# Patient Record
Sex: FEMALE | Race: WHITE | Employment: OTHER | URBAN - METROPOLITAN AREA
[De-identification: names, ages, dates, MRNs, and addresses within clinical notes are randomized per-mention and may not be internally consistent; named-entity substitution may affect disease eponyms.]

---

## 2019-11-19 ENCOUNTER — APPOINTMENT (OUTPATIENT)
Dept: CT IMAGING | Age: 84
End: 2019-11-19
Attending: EMERGENCY MEDICINE
Payer: MEDICARE

## 2019-11-19 ENCOUNTER — APPOINTMENT (OUTPATIENT)
Dept: GENERAL RADIOLOGY | Age: 84
End: 2019-11-19
Attending: EMERGENCY MEDICINE
Payer: MEDICARE

## 2019-11-19 ENCOUNTER — HOSPITAL ENCOUNTER (OUTPATIENT)
Age: 84
Setting detail: OBSERVATION
Discharge: NURSING, REHAB OR LONG-TERM CARE | End: 2019-11-22
Attending: EMERGENCY MEDICINE | Admitting: FAMILY MEDICINE
Payer: MEDICARE

## 2019-11-19 DIAGNOSIS — S51.011A ELBOW LACERATION, RIGHT, INITIAL ENCOUNTER: ICD-10-CM

## 2019-11-19 DIAGNOSIS — S01.01XA LACERATION OF SCALP, INITIAL ENCOUNTER: ICD-10-CM

## 2019-11-19 DIAGNOSIS — R09.02 HYPOXIA: ICD-10-CM
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
## 2019-11-19 DIAGNOSIS — N39.0 URINARY TRACT INFECTION WITHOUT HEMATURIA, SITE UNSPECIFIED: ICD-10-CM

## 2019-11-19 LAB
ALBUMIN SERPL-MCNC: 3.3 G/DL (ref 3.2–4.6)
ALBUMIN/GLOB SERPL: 0.9 {RATIO} (ref 1.2–3.5)
ALP SERPL-CCNC: 97 U/L (ref 50–136)
ALT SERPL-CCNC: 17 U/L (ref 12–65)
ANION GAP SERPL CALC-SCNC: 5 MMOL/L (ref 7–16)
APPEARANCE UR: ABNORMAL
ARTERIAL PATENCY WRIST A: YES
AST SERPL-CCNC: 18 U/L (ref 15–37)
BACTERIA URNS QL MICRO: ABNORMAL /HPF
BASE EXCESS BLD CALC-SCNC: 1 MMOL/L
BASOPHILS # BLD: 0 K/UL (ref 0–0.2)
BASOPHILS NFR BLD: 1 % (ref 0–2)
BDY SITE: ABNORMAL
BILIRUB SERPL-MCNC: 0.5 MG/DL (ref 0.2–1.1)
BILIRUB UR QL: NEGATIVE
BNP SERPL-MCNC: 797 PG/ML
BODY TEMPERATURE: 98.6
BUN SERPL-MCNC: 22 MG/DL (ref 8–23)
CALCIUM SERPL-MCNC: 9.4 MG/DL (ref 8.3–10.4)
CHLORIDE SERPL-SCNC: 108 MMOL/L (ref 98–107)
CO2 BLD-SCNC: 25 MMOL/L
CO2 SERPL-SCNC: 28 MMOL/L (ref 21–32)
COLLECT TIME,HTIME: 2151
COLOR UR: YELLOW
CREAT SERPL-MCNC: 0.83 MG/DL (ref 0.6–1)
DIFFERENTIAL METHOD BLD: ABNORMAL
EOSINOPHIL # BLD: 0.3 K/UL (ref 0–0.8)
EOSINOPHIL NFR BLD: 5 % (ref 0.5–7.8)
EPI CELLS #/AREA URNS HPF: ABNORMAL /HPF
ERYTHROCYTE [DISTWIDTH] IN BLOOD BY AUTOMATED COUNT: 13.2 % (ref 11.9–14.6)
GAS FLOW.O2 O2 DELIVERY SYS: ABNORMAL L/MIN
GLOBULIN SER CALC-MCNC: 3.8 G/DL (ref 2.3–3.5)
GLUCOSE SERPL-MCNC: 101 MG/DL (ref 65–100)
GLUCOSE UR STRIP.AUTO-MCNC: NEGATIVE MG/DL
HCO3 BLD-SCNC: 24 MMOL/L (ref 22–26)
HCT VFR BLD AUTO: 40.7 % (ref 35.8–46.3)
HGB BLD-MCNC: 14 G/DL (ref 11.7–15.4)
HGB UR QL STRIP: NEGATIVE
IMM GRANULOCYTES # BLD AUTO: 0 K/UL (ref 0–0.5)
IMM GRANULOCYTES NFR BLD AUTO: 1 % (ref 0–5)
KETONES UR QL STRIP.AUTO: NEGATIVE MG/DL
LEUKOCYTE ESTERASE UR QL STRIP.AUTO: ABNORMAL
LYMPHOCYTES # BLD: 1.2 K/UL (ref 0.5–4.6)
LYMPHOCYTES NFR BLD: 19 % (ref 13–44)
MCH RBC QN AUTO: 34.7 PG (ref 26.1–32.9)
MCHC RBC AUTO-ENTMCNC: 34.4 G/DL (ref 31.4–35)
MCV RBC AUTO: 100.7 FL (ref 79.6–97.8)
MONOCYTES # BLD: 0.6 K/UL (ref 0.1–1.3)
MONOCYTES NFR BLD: 9 % (ref 4–12)
NEUTS SEG # BLD: 4.1 K/UL (ref 1.7–8.2)
NEUTS SEG NFR BLD: 66 % (ref 43–78)
NITRITE UR QL STRIP.AUTO: NEGATIVE
NRBC # BLD: 0 K/UL (ref 0–0.2)
O2/TOTAL GAS SETTING VFR VENT: 21 %
OTHER OBSERVATIONS,UCOM: ABNORMAL
PCO2 BLD: 33.3 MMHG (ref 35–45)
PH BLD: 7.46 [PH] (ref 7.35–7.45)
PH UR STRIP: 6.5 [PH] (ref 5–9)
PLATELET # BLD AUTO: 273 K/UL (ref 150–450)
PMV BLD AUTO: 8.9 FL (ref 9.4–12.3)
PO2 BLD: 36 MMHG (ref 75–100)
POTASSIUM SERPL-SCNC: 4.2 MMOL/L (ref 3.5–5.1)
PROT SERPL-MCNC: 7.1 G/DL (ref 6.3–8.2)
PROT UR STRIP-MCNC: NEGATIVE MG/DL
RBC # BLD AUTO: 4.04 M/UL (ref 4.05–5.2)
SAO2 % BLD: 74 % (ref 95–98)
SERVICE CMNT-IMP: ABNORMAL
SERVICE CMNT-IMP: ABNORMAL
SODIUM SERPL-SCNC: 141 MMOL/L (ref 136–145)
SP GR UR REFRACTOMETRY: 1.01 (ref 1–1.02)
SPECIMEN TYPE: ABNORMAL
UROBILINOGEN UR QL STRIP.AUTO: 1 EU/DL (ref 0.2–1)
WBC # BLD AUTO: 6.3 K/UL (ref 4.3–11.1)
WBC URNS QL MICRO: ABNORMAL /HPF

## 2019-11-19 PROCEDURE — 99285 EMERGENCY DEPT VISIT HI MDM: CPT | Performed by: EMERGENCY MEDICINE

## 2019-11-19 PROCEDURE — 87088 URINE BACTERIA CULTURE: CPT

## 2019-11-19 PROCEDURE — 51701 INSERT BLADDER CATHETER: CPT | Performed by: EMERGENCY MEDICINE

## 2019-11-19 PROCEDURE — 70450 CT HEAD/BRAIN W/O DYE: CPT

## 2019-11-19 PROCEDURE — 73552 X-RAY EXAM OF FEMUR 2/>: CPT

## 2019-11-19 PROCEDURE — 81001 URINALYSIS AUTO W/SCOPE: CPT

## 2019-11-19 PROCEDURE — 80053 COMPREHEN METABOLIC PANEL: CPT

## 2019-11-19 PROCEDURE — 85025 COMPLETE CBC W/AUTO DIFF WBC: CPT

## 2019-11-19 PROCEDURE — 77030031139 HC SUT VCRL2 J&J -A: Performed by: EMERGENCY MEDICINE

## 2019-11-19 PROCEDURE — 87086 URINE CULTURE/COLONY COUNT: CPT

## 2019-11-19 PROCEDURE — 77030002916 HC SUT ETHLN J&J -A: Performed by: EMERGENCY MEDICINE

## 2019-11-19 PROCEDURE — 87186 SC STD MICRODIL/AGAR DIL: CPT

## 2019-11-19 PROCEDURE — 71260 CT THORAX DX C+: CPT

## 2019-11-19 PROCEDURE — 71046 X-RAY EXAM CHEST 2 VIEWS: CPT

## 2019-11-19 PROCEDURE — 83880 ASSAY OF NATRIURETIC PEPTIDE: CPT

## 2019-11-19 PROCEDURE — 75810000293 HC SIMP/SUPERF WND  RPR: Performed by: EMERGENCY MEDICINE

## 2019-11-19 PROCEDURE — 74011250637 HC RX REV CODE- 250/637: Performed by: EMERGENCY MEDICINE

## 2019-11-19 PROCEDURE — 36600 WITHDRAWAL OF ARTERIAL BLOOD: CPT

## 2019-11-19 PROCEDURE — 74011636320 HC RX REV CODE- 636/320: Performed by: EMERGENCY MEDICINE

## 2019-11-19 PROCEDURE — 77030039266 HC ADH SKN EXOFIN S2SG -A: Performed by: EMERGENCY MEDICINE

## 2019-11-19 PROCEDURE — 82803 BLOOD GASES ANY COMBINATION: CPT

## 2019-11-19 RX ORDER — NITROFURANTOIN 25; 75 MG/1; MG/1
100 CAPSULE ORAL 2 TIMES DAILY
Qty: 6 CAP | Refills: 0 | Status: ON HOLD | OUTPATIENT
Start: 2019-11-19 | End: 2019-11-20

## 2019-11-19 RX ORDER — NITROFURANTOIN MACROCRYSTALS 50 MG/1
50 CAPSULE ORAL
Status: COMPLETED | OUTPATIENT
Start: 2019-11-19 | End: 2019-11-19

## 2019-11-19 RX ADMIN — Medication 10 ML: at 23:00

## 2019-11-19 RX ADMIN — NITROFURANTOIN MACROCRYSTALS 50 MG: 50 CAPSULE ORAL at 20:18

## 2019-11-19 RX ADMIN — IOPAMIDOL 80 ML: 755 INJECTION, SOLUTION INTRAVENOUS at 23:00

## 2019-11-19 NOTE — ED TRIAGE NOTES
EMS reports patient fell while trying to get her telephone. Has a bandaged laceration to her head and bandaged laceration to her right forearm. EMS reports patient is at her normal mentation. EMS reports facility states patient has been seeing dogs and cats.

## 2019-11-19 NOTE — ED PROVIDER NOTES
Provider in triage  80year old female presents to the ER via EMS from her living facility. She is unable to provide any info. EMS reported that patient has been confused and hallucinating per facility. Then got up without assistance and fell. Laceration to forehead, abrasion to the right elbow. CT head and basic medical work up ordered. Awaiting room to complete eval.    Sandra Murray MD      The history is provided by the patient and the EMS personnel. The history is limited by the condition of the patient. Fall   The accident occurred 3 to 5 hours ago. The fall occurred while standing. She fell from a height of ground level. She landed on carpet. The volume of blood lost was minimal. The point of impact was the head and right elbow. The patient is experiencing no pain. She was not ambulatory at the scene. There was no entrapment after the fall. There was no drug use involved in the accident. There was no alcohol use involved in the accident. Associated symptoms include laceration. Pertinent negatives include no visual change, no fever, no numbness, no abdominal pain, no bowel incontinence, no vomiting, no headaches, no loss of consciousness and no tingling. The risk factors include dementia and being elderly. The symptoms are aggravated by pressure on injury.         Past Medical History:   Diagnosis Date    Breast cancer ([facility] [address]     Colitis, acute 5/22/2013    Encephalopathy acute 5/22/2013    Hyperlipidemia     Hypertension     Hypothyroidism 12/23/2010    Hypothyroidism 12/23/2010    Unspecified constipation 5/18/2013       Past Surgical History:   Procedure Laterality Date    BREAST SURGERY PROCEDURE UNLISTED      mastectomy 1995 to L    HX APPENDECTOMY      HX HYSTERECTOMY      HX ORTHOPAEDIC      left hip    HX OTHER SURGICAL      hernia         Family History:   Problem Relation Age of Onset    Heart Disease Mother     Cancer Father        Social History     Socioeconomic History  Marital status:      Spouse name: Not on file    Number of children: Not on file    Years of education: Not on file    Highest education level: Not on file   Occupational History    Not on file   Social Needs    Financial resource strain: Not on file    Food insecurity:     Worry: Not on file     Inability: Not on file    Transportation needs:     Medical: Not on file     Non-medical: Not on file   Tobacco Use    Smoking status: Never Smoker    Smokeless tobacco: Never Used   Substance and Sexual Activity    Alcohol use: No    Drug use: No    Sexual activity: Not on file   Lifestyle    Physical activity:     Days per week: Not on file     Minutes per session: Not on file    Stress: Not on file   Relationships    Social connections:     Talks on phone: Not on file     Gets together: Not on file     Attends Confucianism service: Not on file     Active member of club or organization: Not on file     Attends meetings of clubs or organizations: Not on file     Relationship status: Not on file    Intimate partner violence:     Fear of current or ex partner: Not on file     Emotionally abused: Not on file     Physically abused: Not on file     Forced sexual activity: Not on file   Other Topics Concern    Not on file   Social History Narrative    Not on file         ALLERGIES: Codeine; Penicillin g; and Adhesive tape    Review of Systems   Unable to perform ROS: Dementia   Constitutional: Negative for fever. Gastrointestinal: Negative for abdominal pain, bowel incontinence and vomiting. Neurological: Negative for tingling, loss of consciousness, numbness and headaches. Psychiatric/Behavioral: Positive for hallucinations. Vitals:    11/19/19 1521   BP: 147/54   Pulse: 74   Resp: 16   Temp: 98.4 °F (36.9 °C)   SpO2: 92%   Weight: 43.1 kg (95 lb)   Height: 5' 2\" (1.575 m)            Physical Exam   Constitutional: She appears well-developed and well-nourished. No distress.    HENT:   Head: Normocephalic. Head is with contusion and with laceration. Head is without raccoon's eyes and without Rojas's sign. Right Ear: External ear normal. No hemotympanum. Left Ear: External ear normal. No hemotympanum. Mouth/Throat: Oropharynx is clear and moist.   Eyes: Pupils are equal, round, and reactive to light. Conjunctivae and EOM are normal.   Neck: Normal range of motion. Neck supple. Cardiovascular: Normal rate, regular rhythm, normal heart sounds and intact distal pulses. Pulmonary/Chest: Effort normal and breath sounds normal.   Abdominal: Soft. Bowel sounds are normal. There is no tenderness. Musculoskeletal: Normal range of motion. She exhibits no edema. Right forearm: She exhibits laceration. She exhibits no deformity. Arms:  Neurological: She is alert. She has normal strength. She is disoriented. No cranial nerve deficit or sensory deficit. Skin: Skin is warm and dry. Capillary refill takes less than 2 seconds. Laceration noted. Psychiatric: She has a normal mood and affect. Her speech is normal. Cognition and memory are impaired. Nursing note and vitals reviewed.        MDM  Number of Diagnoses or Management Options  Elbow laceration, right, initial encounter: new and does not require workup  Fall, initial encounter: new and requires workup  Laceration of scalp, initial encounter: new and requires workup  Urinary tract infection without hematuria, site unspecified: new and requires workup     Amount and/or Complexity of Data Reviewed  Clinical lab tests: ordered and reviewed  Tests in the radiology section of CPT®: ordered and reviewed  Obtain history from someone other than the patient: yes  Review and summarize past medical records: yes (Patient was noted to have a right greater trochanteric hip fracture in July of this year at [address])    Risk of Complications, Morbidity, and/or Mortality  Presenting problems: high  Diagnostic procedures: moderate  Management options: moderate    Patient Progress  Patient progress: stable         Wound Repair  Date/Time: 11/19/2019 6:00 PM  Performed by: attendingPreparation: skin prepped with Betadine  Pre-procedure re-eval: Immediately prior to the procedure, the patient was reevaluated and found suitable for the planned procedure and any planned medications. Location details: scalp and right elbow  Wound length:2.6 - 7.5 cm    Anesthesia:  Local Anesthetic: lidocaine 1% without epinephrine  Anesthetic total: 1 mL  Foreign bodies: no foreign bodies  Irrigation solution: saline  Irrigation method: syringe  Debridement: none  Skin closure: 5-0 nylon and glue  Number of sutures: 2  Technique: simple  Approximation: close  Patient tolerance: Patient tolerated the procedure well with no immediate complications  My total time at bedside, performing this procedure was 1-15 minutes. Comments: 1 cm laceration of the right elbow was cleaned, prepped with Betadine, and subsequently closed with Dermabond. The scalp wound was closed with two 4-0 Vicryl sutures        Results Reviewed:      Recent Results (from the past 24 hour(s))   CBC WITH AUTOMATED DIFF    Collection Time: 11/19/19  3:32 PM   Result Value Ref Range    WBC 6.3 4.3 - 11.1 K/uL    RBC 4.04 (L) 4.05 - 5.2 M/uL    HGB 14.0 11.7 - 15.4 g/dL    HCT 40.7 35.8 - 46.3 %    .7 (H) 79.6 - 97.8 FL    MCH 34.7 (H) 26.1 - 32.9 PG    MCHC 34.4 31.4 - 35.0 g/dL    RDW 13.2 11.9 - 14.6 %    PLATELET 389 168 - 034 K/uL    MPV 8.9 (L) 9.4 - 12.3 FL    ABSOLUTE NRBC 0.00 0.0 - 0.2 K/uL    DF AUTOMATED      NEUTROPHILS 66 43 - 78 %    LYMPHOCYTES 19 13 - 44 %    MONOCYTES 9 4.0 - 12.0 %    EOSINOPHILS 5 0.5 - 7.8 %    BASOPHILS 1 0.0 - 2.0 %    IMMATURE GRANULOCYTES 1 0.0 - 5.0 %    ABS. NEUTROPHILS 4.1 1.7 - 8.2 K/UL    ABS. LYMPHOCYTES 1.2 0.5 - 4.6 K/UL    ABS. MONOCYTES 0.6 0.1 - 1.3 K/UL    ABS. EOSINOPHILS 0.3 0.0 - 0.8 K/UL    ABS. BASOPHILS 0.0 0.0 - 0.2 K/UL    ABS. IMM.  GRANS. 0.0 0.0 - 0.5 K/UL   METABOLIC PANEL, COMPREHENSIVE    Collection Time: 11/19/19  3:32 PM   Result Value Ref Range    Sodium 141 136 - 145 mmol/L    Potassium 4.2 3.5 - 5.1 mmol/L    Chloride 108 (H) 98 - 107 mmol/L    CO2 28 21 - 32 mmol/L    Anion gap 5 (L) 7 - 16 mmol/L    Glucose 101 (H) 65 - 100 mg/dL    BUN 22 8 - 23 MG/DL    Creatinine 0.83 0.6 - 1.0 MG/DL    GFR est AA >60 >60 ml/min/1.73m2    GFR est non-AA >60 >60 ml/min/1.73m2    Calcium 9.4 8.3 - 10.4 MG/DL    Bilirubin, total 0.5 0.2 - 1.1 MG/DL    ALT (SGPT) 17 12 - 65 U/L    AST (SGOT) 18 15 - 37 U/L    Alk. phosphatase 97 50 - 136 U/L    Protein, total 7.1 6.3 - 8.2 g/dL    Albumin 3.3 3.2 - 4.6 g/dL    Globulin 3.8 (H) 2.3 - 3.5 g/dL    A-G Ratio 0.9 (L) 1.2 - 3.5     URINALYSIS W/ RFLX MICROSCOPIC    Collection Time: 11/19/19  5:07 PM   Result Value Ref Range    Color YELLOW      Appearance CLOUDY      Specific gravity 1.012 1.001 - 1.023      pH (UA) 6.5 5.0 - 9.0      Protein NEGATIVE  NEG mg/dL    Glucose NEGATIVE  mg/dL    Ketone NEGATIVE  NEG mg/dL    Bilirubin NEGATIVE  NEG      Blood NEGATIVE  NEG      Urobilinogen 1.0 0.2 - 1.0 EU/dL    Nitrites NEGATIVE  NEG      Leukocyte Esterase TRACE (A) NEG      WBC 0-3 0 /hpf    Epithelial cells 0-3 0 /hpf    Bacteria 4+ (H) 0 /hpf    Other observations RESULTS VERIFIED MANUALLY       XR FEMUR RT 2 VS   Final Result   IMPRESSION: Age-indeterminate avulsion fracture of the greater trochanter. Diffuse osteopenia. Would consider further evaluation with right hip MRI to   better evaluate for occult fracture. XR CHEST PA LAT   Final Result   IMPRESSION: Negative for acute abnormality. CT HEAD WO CONT   Final Result   IMPRESSION:  Negative for acute intracranial abnormality. Chronic changes. I discussed the results of all labs, procedures, radiographs, and treatments with the patient and available family. Treatment plan is agreed upon and the patient is ready for discharge. All voiced understanding of the discharge plan and medication instructions or changes as appropriate. Questions about treatment in the ED were answered. All were encouraged to return should symptoms worsen or new problems develop. Dictated using voice recognition software.  Proofread, but unrecognized errors may exist.

## 2019-11-20 ENCOUNTER — APPOINTMENT (OUTPATIENT)
Dept: MRI IMAGING | Age: 84
End: 2019-11-20
Attending: INTERNAL MEDICINE
Payer: MEDICARE

## 2019-11-20 PROBLEM — N39.0 UTI (URINARY TRACT INFECTION): Status: ACTIVE | Noted: 2019-11-20

## 2019-11-20 PROBLEM — R93.89 ABNORMAL X-RAY: Status: ACTIVE | Noted: 2019-11-20

## 2019-11-20 PROBLEM — R09.02 HYPOXIA: Status: ACTIVE | Noted: 2019-11-20

## 2019-11-20 PROBLEM — G93.41 ACUTE METABOLIC ENCEPHALOPATHY: Status: ACTIVE | Noted: 2019-11-20

## 2019-11-20 LAB
ANION GAP SERPL CALC-SCNC: 7 MMOL/L (ref 7–16)
BASOPHILS # BLD: 0 K/UL (ref 0–0.2)
BASOPHILS NFR BLD: 1 % (ref 0–2)
BUN SERPL-MCNC: 17 MG/DL (ref 8–23)
CALCIUM SERPL-MCNC: 9.2 MG/DL (ref 8.3–10.4)
CHLORIDE SERPL-SCNC: 107 MMOL/L (ref 98–107)
CO2 SERPL-SCNC: 28 MMOL/L (ref 21–32)
CREAT SERPL-MCNC: 0.67 MG/DL (ref 0.6–1)
DIFFERENTIAL METHOD BLD: ABNORMAL
EOSINOPHIL # BLD: 0.6 K/UL (ref 0–0.8)
EOSINOPHIL NFR BLD: 7 % (ref 0.5–7.8)
ERYTHROCYTE [DISTWIDTH] IN BLOOD BY AUTOMATED COUNT: 13.2 % (ref 11.9–14.6)
GLUCOSE SERPL-MCNC: 93 MG/DL (ref 65–100)
HCT VFR BLD AUTO: 38.6 % (ref 35.8–46.3)
HGB BLD-MCNC: 13 G/DL (ref 11.7–15.4)
IMM GRANULOCYTES # BLD AUTO: 0.1 K/UL (ref 0–0.5)
IMM GRANULOCYTES NFR BLD AUTO: 1 % (ref 0–5)
LYMPHOCYTES # BLD: 1.1 K/UL (ref 0.5–4.6)
LYMPHOCYTES NFR BLD: 13 % (ref 13–44)
MCH RBC QN AUTO: 34.3 PG (ref 26.1–32.9)
MCHC RBC AUTO-ENTMCNC: 33.7 G/DL (ref 31.4–35)
MCV RBC AUTO: 101.8 FL (ref 79.6–97.8)
MONOCYTES # BLD: 0.6 K/UL (ref 0.1–1.3)
MONOCYTES NFR BLD: 7 % (ref 4–12)
NEUTS SEG # BLD: 6.1 K/UL (ref 1.7–8.2)
NEUTS SEG NFR BLD: 72 % (ref 43–78)
NRBC # BLD: 0 K/UL (ref 0–0.2)
PLATELET # BLD AUTO: 245 K/UL (ref 150–450)
PMV BLD AUTO: 9.2 FL (ref 9.4–12.3)
POTASSIUM SERPL-SCNC: 3.7 MMOL/L (ref 3.5–5.1)
RBC # BLD AUTO: 3.79 M/UL (ref 4.05–5.2)
SODIUM SERPL-SCNC: 142 MMOL/L (ref 136–145)
WBC # BLD AUTO: 8.5 K/UL (ref 4.3–11.1)

## 2019-11-20 PROCEDURE — 36415 COLL VENOUS BLD VENIPUNCTURE: CPT

## 2019-11-20 PROCEDURE — 80048 BASIC METABOLIC PNL TOTAL CA: CPT

## 2019-11-20 PROCEDURE — C8929 TTE W OR WO FOL WCON,DOPPLER: HCPCS

## 2019-11-20 PROCEDURE — 85025 COMPLETE CBC W/AUTO DIFF WBC: CPT

## 2019-11-20 PROCEDURE — 96372 THER/PROPH/DIAG INJ SC/IM: CPT

## 2019-11-20 PROCEDURE — 77030020256 HC SOL INJ NACL 0.9%  500ML

## 2019-11-20 PROCEDURE — 74011000302 HC RX REV CODE- 302: Performed by: INTERNAL MEDICINE

## 2019-11-20 PROCEDURE — 74011250636 HC RX REV CODE- 250/636: Performed by: FAMILY MEDICINE

## 2019-11-20 PROCEDURE — 74011250637 HC RX REV CODE- 250/637: Performed by: INTERNAL MEDICINE

## 2019-11-20 PROCEDURE — 86580 TB INTRADERMAL TEST: CPT | Performed by: INTERNAL MEDICINE

## 2019-11-20 PROCEDURE — 99218 HC RM OBSERVATION: CPT

## 2019-11-20 PROCEDURE — 74011250636 HC RX REV CODE- 250/636: Performed by: INTERNAL MEDICINE

## 2019-11-20 PROCEDURE — 96365 THER/PROPH/DIAG IV INF INIT: CPT

## 2019-11-20 PROCEDURE — C1713 ANCHOR/SCREW BN/BN,TIS/BN: HCPCS

## 2019-11-20 PROCEDURE — 74011250637 HC RX REV CODE- 250/637: Performed by: FAMILY MEDICINE

## 2019-11-20 PROCEDURE — 77030019605

## 2019-11-20 RX ORDER — ONDANSETRON 2 MG/ML
4 INJECTION INTRAMUSCULAR; INTRAVENOUS
Status: DISCONTINUED | OUTPATIENT
Start: 2019-11-20 | End: 2019-11-22 | Stop reason: HOSPADM

## 2019-11-20 RX ORDER — DIPHENHYDRAMINE HCL 25 MG
25 CAPSULE ORAL
Status: DISCONTINUED | OUTPATIENT
Start: 2019-11-20 | End: 2019-11-22 | Stop reason: HOSPADM

## 2019-11-20 RX ORDER — ACETAMINOPHEN 500 MG
1000 TABLET ORAL
COMMUNITY
End: 2019-11-22

## 2019-11-20 RX ORDER — ATORVASTATIN CALCIUM 10 MG/1
10 TABLET, FILM COATED ORAL
Status: DISCONTINUED | OUTPATIENT
Start: 2019-11-20 | End: 2019-11-22 | Stop reason: HOSPADM

## 2019-11-20 RX ORDER — METOPROLOL SUCCINATE 50 MG/1
50 TABLET, EXTENDED RELEASE ORAL DAILY
Status: DISCONTINUED | OUTPATIENT
Start: 2019-11-20 | End: 2019-11-22 | Stop reason: HOSPADM

## 2019-11-20 RX ORDER — FAMOTIDINE 20 MG/1
20 TABLET, FILM COATED ORAL DAILY
Status: DISCONTINUED | OUTPATIENT
Start: 2019-11-20 | End: 2019-11-22 | Stop reason: HOSPADM

## 2019-11-20 RX ORDER — PANTOPRAZOLE SODIUM 40 MG/1
40 TABLET, DELAYED RELEASE ORAL
Status: DISCONTINUED | OUTPATIENT
Start: 2019-11-20 | End: 2019-11-22 | Stop reason: HOSPADM

## 2019-11-20 RX ORDER — LANOLIN ALCOHOL/MO/W.PET/CERES
3 CREAM (GRAM) TOPICAL
COMMUNITY

## 2019-11-20 RX ORDER — ASPIRIN 81 MG/1
81 TABLET ORAL DAILY
Status: DISCONTINUED | OUTPATIENT
Start: 2019-11-20 | End: 2019-11-22 | Stop reason: HOSPADM

## 2019-11-20 RX ORDER — SODIUM CHLORIDE 0.9 % (FLUSH) 0.9 %
10 SYRINGE (ML) INJECTION
Status: COMPLETED | OUTPATIENT
Start: 2019-11-20 | End: 2019-11-19

## 2019-11-20 RX ORDER — CHOLECALCIFEROL (VITAMIN D3) 125 MCG
2000 CAPSULE ORAL DAILY
COMMUNITY

## 2019-11-20 RX ORDER — LEVOTHYROXINE SODIUM 25 UG/1
25 TABLET ORAL
COMMUNITY

## 2019-11-20 RX ORDER — FUROSEMIDE 20 MG/1
20 TABLET ORAL DAILY
Status: DISCONTINUED | OUTPATIENT
Start: 2019-11-20 | End: 2019-11-22 | Stop reason: HOSPADM

## 2019-11-20 RX ORDER — SODIUM CHLORIDE 0.9 % (FLUSH) 0.9 %
5-40 SYRINGE (ML) INJECTION AS NEEDED
Status: DISCONTINUED | OUTPATIENT
Start: 2019-11-20 | End: 2019-11-22 | Stop reason: HOSPADM

## 2019-11-20 RX ORDER — SODIUM CHLORIDE 0.9 % (FLUSH) 0.9 %
5-40 SYRINGE (ML) INJECTION EVERY 8 HOURS
Status: DISCONTINUED | OUTPATIENT
Start: 2019-11-20 | End: 2019-11-22 | Stop reason: HOSPADM

## 2019-11-20 RX ORDER — MIRTAZAPINE 15 MG/1
15 TABLET, FILM COATED ORAL
COMMUNITY

## 2019-11-20 RX ORDER — BISACODYL 5 MG
5 TABLET, DELAYED RELEASE (ENTERIC COATED) ORAL DAILY PRN
Status: DISCONTINUED | OUTPATIENT
Start: 2019-11-20 | End: 2019-11-22 | Stop reason: HOSPADM

## 2019-11-20 RX ORDER — POTASSIUM CHLORIDE 750 MG/1
10 TABLET, EXTENDED RELEASE ORAL DAILY
Status: DISCONTINUED | OUTPATIENT
Start: 2019-11-20 | End: 2019-11-22 | Stop reason: HOSPADM

## 2019-11-20 RX ORDER — MELOXICAM 7.5 MG/1
7.5 TABLET ORAL
COMMUNITY
End: 2019-11-22

## 2019-11-20 RX ORDER — HEPARIN SODIUM 5000 [USP'U]/ML
5000 INJECTION, SOLUTION INTRAVENOUS; SUBCUTANEOUS EVERY 8 HOURS
Status: DISCONTINUED | OUTPATIENT
Start: 2019-11-20 | End: 2019-11-22 | Stop reason: HOSPADM

## 2019-11-20 RX ORDER — ALBUTEROL SULFATE 0.83 MG/ML
2.5 SOLUTION RESPIRATORY (INHALATION)
Status: DISCONTINUED | OUTPATIENT
Start: 2019-11-20 | End: 2019-11-22 | Stop reason: HOSPADM

## 2019-11-20 RX ORDER — AMLODIPINE BESYLATE 5 MG/1
2.5 TABLET ORAL DAILY
Status: DISCONTINUED | OUTPATIENT
Start: 2019-11-20 | End: 2019-11-22 | Stop reason: HOSPADM

## 2019-11-20 RX ORDER — UREA 10 %
100 LOTION (ML) TOPICAL DAILY
COMMUNITY

## 2019-11-20 RX ORDER — LOSARTAN POTASSIUM 50 MG/1
100 TABLET ORAL DAILY
Status: DISCONTINUED | OUTPATIENT
Start: 2019-11-20 | End: 2019-11-22 | Stop reason: HOSPADM

## 2019-11-20 RX ORDER — NALOXONE HYDROCHLORIDE 0.4 MG/ML
0.4 INJECTION, SOLUTION INTRAMUSCULAR; INTRAVENOUS; SUBCUTANEOUS AS NEEDED
Status: DISCONTINUED | OUTPATIENT
Start: 2019-11-20 | End: 2019-11-22 | Stop reason: HOSPADM

## 2019-11-20 RX ORDER — SENNOSIDES 8.6 MG/1
2 TABLET ORAL
COMMUNITY

## 2019-11-20 RX ORDER — ADHESIVE BANDAGE
15 BANDAGE TOPICAL
Status: DISCONTINUED | OUTPATIENT
Start: 2019-11-20 | End: 2019-11-22 | Stop reason: HOSPADM

## 2019-11-20 RX ORDER — LEVOTHYROXINE SODIUM 50 UG/1
25 TABLET ORAL
Status: DISCONTINUED | OUTPATIENT
Start: 2019-11-20 | End: 2019-11-22 | Stop reason: HOSPADM

## 2019-11-20 RX ORDER — CIPROFLOXACIN 2 MG/ML
400 INJECTION, SOLUTION INTRAVENOUS EVERY 24 HOURS
Status: DISCONTINUED | OUTPATIENT
Start: 2019-11-20 | End: 2019-11-22 | Stop reason: HOSPADM

## 2019-11-20 RX ORDER — ACETAMINOPHEN 325 MG/1
650 TABLET ORAL
Status: DISCONTINUED | OUTPATIENT
Start: 2019-11-20 | End: 2019-11-22 | Stop reason: HOSPADM

## 2019-11-20 RX ORDER — MECLIZINE HCL 12.5 MG 12.5 MG/1
12.5 TABLET ORAL
COMMUNITY

## 2019-11-20 RX ORDER — CALCIUM CARBONATE 600 MG
600 TABLET ORAL 2 TIMES DAILY
COMMUNITY

## 2019-11-20 RX ADMIN — FUROSEMIDE 20 MG: 20 TABLET ORAL at 09:53

## 2019-11-20 RX ADMIN — CIPROFLOXACIN 400 MG: 2 INJECTION, SOLUTION INTRAVENOUS at 09:52

## 2019-11-20 RX ADMIN — METOPROLOL SUCCINATE 50 MG: 50 TABLET, EXTENDED RELEASE ORAL at 09:53

## 2019-11-20 RX ADMIN — HEPARIN SODIUM 5000 UNITS: 5000 INJECTION INTRAVENOUS; SUBCUTANEOUS at 06:51

## 2019-11-20 RX ADMIN — ATORVASTATIN CALCIUM 10 MG: 10 TABLET, FILM COATED ORAL at 22:44

## 2019-11-20 RX ADMIN — AMLODIPINE BESYLATE 2.5 MG: 5 TABLET ORAL at 09:52

## 2019-11-20 RX ADMIN — Medication 1 AMPULE: at 22:44

## 2019-11-20 RX ADMIN — MAGNESIUM HYDROXIDE 15 ML: 400 SUSPENSION ORAL at 22:43

## 2019-11-20 RX ADMIN — PERFLUTREN 1 ML: 6.52 INJECTION, SUSPENSION INTRAVENOUS at 09:30

## 2019-11-20 RX ADMIN — ASPIRIN 81 MG: 81 TABLET ORAL at 09:53

## 2019-11-20 RX ADMIN — TUBERCULIN PURIFIED PROTEIN DERIVATIVE 5 UNITS: 5 INJECTION, SOLUTION INTRADERMAL at 09:54

## 2019-11-20 RX ADMIN — Medication 5 ML: at 01:52

## 2019-11-20 RX ADMIN — POTASSIUM CHLORIDE 10 MEQ: 10 TABLET, EXTENDED RELEASE ORAL at 09:53

## 2019-11-20 RX ADMIN — LOSARTAN POTASSIUM 100 MG: 50 TABLET, FILM COATED ORAL at 09:53

## 2019-11-20 RX ADMIN — Medication 5 ML: at 06:52

## 2019-11-20 RX ADMIN — Medication 10 ML: at 15:15

## 2019-11-20 RX ADMIN — PANTOPRAZOLE SODIUM 40 MG: 40 TABLET, DELAYED RELEASE ORAL at 06:51

## 2019-11-20 RX ADMIN — LEVOTHYROXINE SODIUM 25 MCG: 50 TABLET ORAL at 09:53

## 2019-11-20 RX ADMIN — Medication 10 ML: at 22:47

## 2019-11-20 RX ADMIN — HEPARIN SODIUM 5000 UNITS: 5000 INJECTION INTRAVENOUS; SUBCUTANEOUS at 15:16

## 2019-11-20 RX ADMIN — FAMOTIDINE 20 MG: 20 TABLET ORAL at 09:53

## 2019-11-20 RX ADMIN — HEPARIN SODIUM 5000 UNITS: 5000 INJECTION INTRAVENOUS; SUBCUTANEOUS at 22:44

## 2019-11-20 NOTE — H&P
HOSPITALIST H&P  NAME:  Diana Rajan   Age:  80 y.o.  :   1923   MRN:   [id number]  PCP: Nahum Sheikh MD  Treatment Team: Attending Provider: Rich Erickson MD; Primary Nurse: Vick Huff RN    Prior     CC: Reason for admission is: hypoxia    HPI:   Patient history was obtained from the ER provider prior to seeing the patient. Patient is a 80 y.o. female who presents to the ER due to a fall at her SNF in which she injured her right forearm and a laceration to her scalp as well. EMS reported that she was at her mental baseline. However there was some accessory report of perhaps she was having some hallucinations. Work-up in the emergency room unfortunately showed her to be hypoxic, at one point with a room air sat of 77%. She has required O2 supplementation to keep her O2 saturations up. She has no prior diagnosis of lung disease or prior history of using oxygen. Chest x-ray was unrevealing. A CT scan of her chest was done primarily to rule out a PE, this test was negative as well. CT did suggest COPD and/or atelectasis. She denies any fevers or chills, nausea vomiting or diarrhea, chest pain, cough/congestion. We are asked to admit to further evaluate her hypoxia. ROS:  All systems have been reviewed and are negative except as stated in HPI or elsewhere.       Past Medical History:   Diagnosis Date    Breast cancer ([facility] [address]     Colitis, acute 2013    Encephalopathy acute 2013    Hyperlipidemia     Hypertension     Hypothyroidism 2010    Hypothyroidism 2010    Unspecified constipation 2013      Past Surgical History:   Procedure Laterality Date    BREAST SURGERY PROCEDURE UNLISTED      mastectomy  to L    HX APPENDECTOMY      HX HYSTERECTOMY      HX ORTHOPAEDIC      left hip    HX OTHER SURGICAL      hernia      Social History     Tobacco Use    Smoking status: Never Smoker    Smokeless tobacco: Never Used   Substance Use Topics    Alcohol use: No      Family History   Problem Relation Age of Onset    Heart Disease Mother     Cancer Father        FH Reviewed and non-contributory to admitting diagnosis    Allergies   Allergen Reactions    Codeine Nausea and Vomiting    Penicillin G Not Reported This Time    Adhesive Tape Contact Dermatitis      Prior to Admission Medications   Prescriptions Last Dose Informant Patient Reported? Taking? MAGNESIUM HYDROXIDE (MILK OF MAGNESIA PO)   Yes No   Sig: Take 30 mL by mouth nightly. acetaminophen 650 mg Tab   No No   Sig: Take 650 mg by mouth every four (4) hours as needed. amLODIPine (NORVASC) 2.5 mg tablet   Yes No   Sig: Take 2.5 mg by mouth daily. aspirin 81 mg Tab   Yes No   Sig: Take 81 mg by mouth daily. atorvastatin (LIPITOR) 10 mg tablet   Yes No   famotidine (PEPCID) 20 mg tablet   Yes No   Sig: Take 20 mg by mouth two (2) times a day. furosemide (LASIX) 20 mg tablet   Yes No   levothyroxine (SYNTHROID) 100 mcg tablet   Yes No   Sig: Take 100 mcg by mouth Daily (before breakfast). PATIENT TAKES 100 MCG DAILY FOR 2 DAYS, THEN USES 50 MCG FOR 5 DAYS, THEN BACK  MCG   losartan (COZAAR) 100 mg tablet   No No   Sig: Take 1 Tab by mouth daily. metoprolol-XL (TOPROL XL) 50 mg XL tablet   No No   Sig: Take 1 Tab by mouth daily. mupirocin (BACTROBAN) 2 % ointment   No No   Sig: Apply  to affected area two (2) times a day.    omeprazole (PRILOSEC) 20 mg capsule   Yes No   potassium chloride (K-DUR, KLOR-CON) 10 mEq tablet   Yes No      Facility-Administered Medications: None         Objective:     No intake or output data in the 24 hours ending 19 0052   Temp (24hrs), Av.4 °F (36.9 °C), Min:98.4 °F (36.9 °C), Max:98.4 °F (36.9 °C)    Oxygen Therapy  O2 Sat (%): 92 % (19)  Pulse via Oximetry: 81 beats per minute (19)  O2 Device: Nasal cannula (19)  O2 Flow Rate (L/min): 2 l/min (19)   Body mass index is 17.38 kg/m². Patient Vitals for the past 24 hrs:   Temp Pulse Resp BP SpO2   11/19/19 2331  81  149/64 92 %   11/19/19 2320  84  176/84 95 %   11/19/19 2240     94 %   11/19/19 2239    176/84    11/19/19 2033     92 %   11/19/19 2027     90 %   11/19/19 2021     (!) 86 %   11/19/19 2020    137/74 (!) 77 %   11/19/19 1521 98.4 °F (36.9 °C) 74 16 147/54 92 %     Physical Exam:    General:    WD and WN, No apparent distress. Thin, frail, elderly  Head:   Normocephalic, without obvious abnormality, atraumatic. Eyes:  PERRL; EOMI; sclera normal/non-icteric  ENT:  Hearing is normal.  Oropharynx is clear with tacky mucous membranes   Resp:    Clear to auscultation bilaterally. No Wheezing or Rhonchi. Resp are even and unlabored  Heart[de-identified]  Regular rate and rhythm,  no murmur,   No LE edema  Abdomen:   Soft, non-tender. Not distended. Bowel sounds normal.  hepato-splenomegaly -none   Musc/SK: Muscle strength is good and tone normal; No cyanosis. No clubbing  Skin:     Texture, turgor normal. Skin tear to rt elbow;  Small scalp laceration with 2 sutures  Capillary refill < 2 sec  Neurologic: CN II - XII are grossly intact - Eye exam as noted above  Psych: Drowsy and oriented x 2;  Judgement and insight are impaired     Data Review:   Recent Results (from the past 24 hour(s))   CBC WITH AUTOMATED DIFF    Collection Time: 11/19/19  3:32 PM   Result Value Ref Range    WBC 6.3 4.3 - 11.1 K/uL    RBC 4.04 (L) 4.05 - 5.2 M/uL    HGB 14.0 11.7 - 15.4 g/dL    HCT 40.7 35.8 - 46.3 %    .7 (H) 79.6 - 97.8 FL    MCH 34.7 (H) 26.1 - 32.9 PG    MCHC 34.4 31.4 - 35.0 g/dL    RDW 13.2 11.9 - 14.6 %    PLATELET 075 005 - 060 K/uL    MPV 8.9 (L) 9.4 - 12.3 FL    ABSOLUTE NRBC 0.00 0.0 - 0.2 K/uL    DF AUTOMATED      NEUTROPHILS 66 43 - 78 %    LYMPHOCYTES 19 13 - 44 %    MONOCYTES 9 4.0 - 12.0 %    EOSINOPHILS 5 0.5 - 7.8 %    BASOPHILS 1 0.0 - 2.0 %    IMMATURE GRANULOCYTES 1 0.0 - 5.0 %    ABS.  NEUTROPHILS 4.1 1.7 - 8.2 K/UL    ABS. LYMPHOCYTES 1.2 0.5 - 4.6 K/UL    ABS. MONOCYTES 0.6 0.1 - 1.3 K/UL    ABS. EOSINOPHILS 0.3 0.0 - 0.8 K/UL    ABS. BASOPHILS 0.0 0.0 - 0.2 K/UL    ABS. IMM. GRANS. 0.0 0.0 - 0.5 K/UL   METABOLIC PANEL, COMPREHENSIVE    Collection Time: 11/19/19  3:32 PM   Result Value Ref Range    Sodium 141 136 - 145 mmol/L    Potassium 4.2 3.5 - 5.1 mmol/L    Chloride 108 (H) 98 - 107 mmol/L    CO2 28 21 - 32 mmol/L    Anion gap 5 (L) 7 - 16 mmol/L    Glucose 101 (H) 65 - 100 mg/dL    BUN 22 8 - 23 MG/DL    Creatinine 0.83 0.6 - 1.0 MG/DL    GFR est AA >60 >60 ml/min/1.73m2    GFR est non-AA >60 >60 ml/min/1.73m2    Calcium 9.4 8.3 - 10.4 MG/DL    Bilirubin, total 0.5 0.2 - 1.1 MG/DL    ALT (SGPT) 17 12 - 65 U/L    AST (SGOT) 18 15 - 37 U/L    Alk.  phosphatase 97 50 - 136 U/L    Protein, total 7.1 6.3 - 8.2 g/dL    Albumin 3.3 3.2 - 4.6 g/dL    Globulin 3.8 (H) 2.3 - 3.5 g/dL    A-G Ratio 0.9 (L) 1.2 - 3.5     NT-PRO BNP    Collection Time: 11/19/19  3:32 PM   Result Value Ref Range    NT pro- (H) <450 PG/ML   URINALYSIS W/ RFLX MICROSCOPIC    Collection Time: 11/19/19  5:07 PM   Result Value Ref Range    Color YELLOW      Appearance CLOUDY      Specific gravity 1.012 1.001 - 1.023      pH (UA) 6.5 5.0 - 9.0      Protein NEGATIVE  NEG mg/dL    Glucose NEGATIVE  mg/dL    Ketone NEGATIVE  NEG mg/dL    Bilirubin NEGATIVE  NEG      Blood NEGATIVE  NEG      Urobilinogen 1.0 0.2 - 1.0 EU/dL    Nitrites NEGATIVE  NEG      Leukocyte Esterase TRACE (A) NEG      WBC 0-3 0 /hpf    Epithelial cells 0-3 0 /hpf    Bacteria 4+ (H) 0 /hpf    Other observations RESULTS VERIFIED MANUALLY     POC G3    Collection Time: 11/19/19  9:52 PM   Result Value Ref Range    Device: ROOM AIR      FIO2 (POC) 21 %    pH (POC) 7.465 (H) 7.35 - 7.45      pCO2 (POC) 33.3 (L) 35 - 45 MMHG    pO2 (POC) 36 (LL) 75 - 100 MMHG    HCO3 (POC) 24.0 22 - 26 MMOL/L    sO2 (POC) 74 (L) 95 - 98 %    Base excess (POC) 1 mmol/L    Allens test (POC) YES      Site RIGHT RADIAL      Patient temp. 98.6      Specimen type (POC) ARTERIAL      Performed by Dominick     CO2, POC 25 MMOL/L    Respiratory comment: PhysicianNotified     COLLECT TIME 2,151       CXR Results  (Last 48 hours)               11/19/19 1643  XR CHEST PA LAT Final result    Impression:  IMPRESSION: Negative for acute abnormality. Narrative:  CHEST X-RAY, 2 views. HISTORY:  Confusion. TECHNIQUE: PA and lateral views. COMPARISON: May 2013. FINDINGS:    *Lungs: Inspiration is shallow. Lungs are grossly clear. .    *Heart size: is normal.    *Costophrenic angles: are sharp. *Pulmonary vasculature: is unremarkable. *Included portion of the upper abdomen: is unremarkable. *Bones: No gross bony lesions. *Other: None. CT Results  (Last 48 hours)               11/20/19 0027  CT CHEST W CONT Final result    Impression:  IMPRESSION:       No evidence of pulmonary embolism. Are graph COPD. Bibasilar atelectasis or pneumonia right worse than left. Small right pleural effusion. Date of Dictation: 11/20/2019 12:24 AM       Narrative:  CTA OF THE CHEST - PE STUDY       HISTORY: Hypoxia       COMPARISON: None       TECHNIQUE: A helical acquisition was performed through the chest utilizing   7.40UM slice thickness during the infusion of 100 cc of Isovue-370. 3-D   post-processed images were created on an independent workstation. Multiplanar   reformats were obtained. The exam was focused on the pulmonary arteries. Dose reduction techniques used: Automated exposure control, adjustment of the   mAs and/or kVp according to patient's size, and iterative reconstruction   techniques. FINDINGS:   *  PULMONARY VESSELS: No evidence of pulmonary embolism. *  PLEURA / PERICARDIUM: Small right pleural effusion. *  TOREY / MEDIASTINUM: Within normal limits. *  LUNGS: COPD.  Bibasilar airspace disease right worse than left. *  TRACHEOBRONCHIAL TREE: Within normal limits. *  AORTA: Within normal limits. *  CORONARY ARTERIES: No coronary artery calcification is seen. *  CHEST WALL/AXILLA: Within normal limits. *  VISUALIZED UPPER ABDOMEN: Within normal limits. *  SPINE / BONES: Within normal limits. *  ADDITIONAL COMMENTS: None. 11/19/19 1623  CT HEAD WO CONT Final result    Impression:  IMPRESSION:  Negative for acute intracranial abnormality. Chronic changes. Narrative:  CT HEAD WITHOUT CONTRAST. INDICATION: Head injury sustained in fall. COMPARISON: May 2013. TECHNIQUE:   5 mm axial scans from the skull base to the vertex. Our CT   scanners use one or more of the following:  Automated exposure control,   adjustment of the mA and or kV according to patient size, iterative   reconstruction. FINDINGS:  No acute intraparenchymal hemorrhage or abnormal extra-axial fluid   collection. The ventricles are normal size. No midline shift or mass effect. Mild white matter low attenuation is present, nonspecific, likely chronic small   vessel disease. Included portion of the paranasal sinuses and orbits grossly   unremarkable. No skull fracture. Assessment and Plan: Active Hospital Problems    Diagnosis Date Noted    Hypoxia 11/20/2019    HTN (hypertension) 12/23/2010     Principal Problem:    Hypoxia (11/20/2019)  Unclear etiology and perhaps just atelectasis. We will continue to supplement with 2 L of oxygen. Will reevaluate tomorrow. There does not seem to be any particular etiology that needs treatment. She will likely just need ongoing home oxygen. We will try incentive spirometer but I doubt that she can do that adequately. Active Problems:    HTN (hypertension) (12/23/2010)    Continue home meds and add prn hydralazine, if needed.       · PLAN General  ·   · Cont appropriate home meds (see MAR)  · Control symptoms (pain, n/v, fever, etc)  · Monitor appropriate labs   · DVT prophylaxis:  Lovenox  · Code status: Full;  HCPOA:   · Risk: high  · Anticipated DC needs:  Oxygen  · Estimated LOS:  less than 2 midnights  · Plans discussed with patient and/or caregiver; questions answered.       Med records reviewed if applicable; findings:     Critical care time if applicable:      Signed By: Carmen Burnham MD     November 20, 2019

## 2019-11-20 NOTE — PROGRESS NOTES
Attempted to met with pt this afternoon, pt found sleeping in bed, niece Johnie Early at bedside, spoke with her and she contacted daughter Alfie Canchola by phone and CM spoke with her. States pt lives at [facility] in [address] [place] [address], plans for pt to return. Per daughter pt was ambulating with walker 2 weeks ago and able to feed self. CM will remain available for DC needs. Daughter states she will be here later this afternoon. Will attempt to met with her tomorrow. PPD placed, pt pending MRI to be completed. Care Management Interventions  PCP Verified by CM:  Yes  Current Support Network: Nursing Facility([facility] [employer] [facility])  Confirm Follow Up Transport: Family  Plan discussed with Pt/Family/Caregiver: Yes  Freedom of Choice Offered: Yes  Discharge Location  [facility]

## 2019-11-20 NOTE — ED NOTES
Report given to LUCAS WATSON [facility], RN and care transferred.

## 2019-11-20 NOTE — ED NOTES
Spoke with valdez at [facility]. She reports pt uses wheelchair for long distances, and uses walker for short distances, and the facility has been aware of an old fracture to the hip. She reports pt does not ever wear oxygen.

## 2019-11-20 NOTE — ED NOTES
Pt given medication for uti and hooked up to monitor after returning from xr. Pt initially 77% on ra with good wave form. Pt o2 sat came up to 86% on ra but no higher. Pt placed on 2lpm nc, o2 sat up to 92%.

## 2019-11-20 NOTE — PROGRESS NOTES
TRANSFER - IN REPORT:    Verbal report received from Reuben RN(name) on [facility],  [address]  being received from [employer]) for routine progression of care      Report consisted of patients Situation, Background, Assessment and   Recommendations(SBAR). Information from the following report(s) SBAR was reviewed with the receiving nurse. Opportunity for questions and clarification was provided. Assessment completed upon patients arrival to unit and care assumed.

## 2019-11-20 NOTE — PROGRESS NOTES
Hospitalist Note     Admit Date:  2019  4:09 PM   Name:  Diana Rajan   Age:  80 y.o.  :  1923   MRN:  [id number]   PCP:  Nahum Sheikh MD  Treatment Team: Attending Provider: Robert Caba MD; Primary Nurse: Vick Huff RN; Charge Nurse: Glory Ramos Utilization Review: Ayde Bates RN    HPI/Subjective:       Ms. Clair Murray is a 79 yo female living at SNF with PMH of HTN, hypothyroidism evaluated s/p fall with forearm and head laceration. She was noted to have hypoxia, O2 sat down to 77% RA while in the ED with associated report of some confusion. CXR and CT head negative. CTA chest showed  Small right effusion and bibasilar atelectasis. Xray of right LE shows age indeterminate avulsion fracture of the greater trochanter. UA mildly positive, pending culture. Discharge plans pending        19 confused, has sutures to left frontal scalp         Objective:     Patient Vitals for the past 24 hrs:   Temp Pulse Resp BP SpO2   19 0454 98.4 °F (36.9 °C) 88 16 136/62 90 %   19  81  149/64 92 %   19 2320  84  176/84 95 %   190     94 %   19    176/84    19     92 %   19     90 %   19     (!) 86 %   19    137/74 (!) 77 %   19 1521 98.4 °F (36.9 °C) 74 16 147/54 92 %     Oxygen Therapy  O2 Sat (%): 90 % (194)  Pulse via Oximetry: 81 beats per minute (19)  O2 Device: Nasal cannula (19)  O2 Flow Rate (L/min): 2 l/min (19)    Estimated body mass index is 17.38 kg/m² as calculated from the following:    Height as of this encounter: 5' 2\" (1.575 m). Weight as of this encounter: 43.1 kg (95 lb).     No intake or output data in the 24 hours ending 19 0756    *Note that automatically entered I/Os may not be accurate; dependent on patient compliance with collection and accurate  by techs.    General:    Alert. Confused, elderly   CV:   RRR. No murmur, rub, or gallop. No edema   Lungs:   CTAB. No wheezing, rhonchi, or rales. anterior  Abdomen:   Soft, nontender, nondistended. Extremities: Warm and dry  Skin:     No rashes or jaundice. Neuro:  No gross focal deficits, confused     Data Review:  I have reviewed all labs, meds, and studies from the last 24 hours:    Recent Results (from the past 24 hour(s))   CBC WITH AUTOMATED DIFF    Collection Time: 11/19/19  3:32 PM   Result Value Ref Range    WBC 6.3 4.3 - 11.1 K/uL    RBC 4.04 (L) 4.05 - 5.2 M/uL    HGB 14.0 11.7 - 15.4 g/dL    HCT 40.7 35.8 - 46.3 %    .7 (H) 79.6 - 97.8 FL    MCH 34.7 (H) 26.1 - 32.9 PG    MCHC 34.4 31.4 - 35.0 g/dL    RDW 13.2 11.9 - 14.6 %    PLATELET 942 688 - 297 K/uL    MPV 8.9 (L) 9.4 - 12.3 FL    ABSOLUTE NRBC 0.00 0.0 - 0.2 K/uL    DF AUTOMATED      NEUTROPHILS 66 43 - 78 %    LYMPHOCYTES 19 13 - 44 %    MONOCYTES 9 4.0 - 12.0 %    EOSINOPHILS 5 0.5 - 7.8 %    BASOPHILS 1 0.0 - 2.0 %    IMMATURE GRANULOCYTES 1 0.0 - 5.0 %    ABS. NEUTROPHILS 4.1 1.7 - 8.2 K/UL    ABS. LYMPHOCYTES 1.2 0.5 - 4.6 K/UL    ABS. MONOCYTES 0.6 0.1 - 1.3 K/UL    ABS. EOSINOPHILS 0.3 0.0 - 0.8 K/UL    ABS. BASOPHILS 0.0 0.0 - 0.2 K/UL    ABS. IMM. GRANS. 0.0 0.0 - 0.5 K/UL   METABOLIC PANEL, COMPREHENSIVE    Collection Time: 11/19/19  3:32 PM   Result Value Ref Range    Sodium 141 136 - 145 mmol/L    Potassium 4.2 3.5 - 5.1 mmol/L    Chloride 108 (H) 98 - 107 mmol/L    CO2 28 21 - 32 mmol/L    Anion gap 5 (L) 7 - 16 mmol/L    Glucose 101 (H) 65 - 100 mg/dL    BUN 22 8 - 23 MG/DL    Creatinine 0.83 0.6 - 1.0 MG/DL    GFR est AA >60 >60 ml/min/1.73m2    GFR est non-AA >60 >60 ml/min/1.73m2    Calcium 9.4 8.3 - 10.4 MG/DL    Bilirubin, total 0.5 0.2 - 1.1 MG/DL    ALT (SGPT) 17 12 - 65 U/L    AST (SGOT) 18 15 - 37 U/L    Alk.  phosphatase 97 50 - 136 U/L    Protein, total 7.1 6.3 - 8.2 g/dL    Albumin 3.3 3.2 - 4.6 g/dL    Globulin 3.8 (H) 2.3 - 3.5 g/dL    A-G Ratio 0.9 (L) 1.2 - 3.5     NT-PRO BNP    Collection Time: 11/19/19  3:32 PM   Result Value Ref Range    NT pro- (H) <450 PG/ML   URINALYSIS W/ RFLX MICROSCOPIC    Collection Time: 11/19/19  5:07 PM   Result Value Ref Range    Color YELLOW      Appearance CLOUDY      Specific gravity 1.012 1.001 - 1.023      pH (UA) 6.5 5.0 - 9.0      Protein NEGATIVE  NEG mg/dL    Glucose NEGATIVE  mg/dL    Ketone NEGATIVE  NEG mg/dL    Bilirubin NEGATIVE  NEG      Blood NEGATIVE  NEG      Urobilinogen 1.0 0.2 - 1.0 EU/dL    Nitrites NEGATIVE  NEG      Leukocyte Esterase TRACE (A) NEG      WBC 0-3 0 /hpf    Epithelial cells 0-3 0 /hpf    Bacteria 4+ (H) 0 /hpf    Other observations RESULTS VERIFIED MANUALLY     POC G3    Collection Time: 11/19/19  9:52 PM   Result Value Ref Range    Device: ROOM AIR      FIO2 (POC) 21 %    pH (POC) 7.465 (H) 7.35 - 7.45      pCO2 (POC) 33.3 (L) 35 - 45 MMHG    pO2 (POC) 36 (LL) 75 - 100 MMHG    HCO3 (POC) 24.0 22 - 26 MMOL/L    sO2 (POC) 74 (L) 95 - 98 %    Base excess (POC) 1 mmol/L    Allens test (POC) YES      Site RIGHT RADIAL      Patient temp. 98.6      Specimen type (POC) ARTERIAL      Performed by Dominick     CO2, POC 25 MMOL/L    Respiratory comment: PhysicianNotified     COLLECT TIME 2,151          All Micro Results     Procedure Component Value Units Date/Time    CULTURE, URINE [[id number]] Collected:  11/19/19 1707    Order Status:  Completed Specimen:  Cath Urine Updated:  11/19/19 2030          No results found for this visit on 11/19/19.     Current Meds:  Current Facility-Administered Medications   Medication Dose Route Frequency    sodium chloride 0.9 % bolus infusion 100 mL  100 mL IntraVENous RAD ONCE    amLODIPine (NORVASC) tablet 2.5 mg  2.5 mg Oral DAILY    aspirin delayed-release tablet 81 mg  81 mg Oral DAILY    atorvastatin (LIPITOR) tablet 10 mg  10 mg Oral QHS    famotidine (PEPCID) tablet 20 mg  20 mg Oral DAILY    furosemide (LASIX) tablet 20 mg  20 mg Oral DAILY    levothyroxine (SYNTHROID) tablet 25 mcg  25 mcg Oral ACB    losartan (COZAAR) tablet 100 mg  100 mg Oral DAILY    magnesium hydroxide (MILK OF MAGNESIA) 400 mg/5 mL oral suspension 15 mL  15 mL Oral QHS    metoprolol succinate (TOPROL-XL) XL tablet 50 mg  50 mg Oral DAILY    pantoprazole (PROTONIX) tablet 40 mg  40 mg Oral ACB    potassium chloride (KLOR-CON) tablet 10 mEq  10 mEq Oral DAILY    sodium chloride (NS) flush 5-40 mL  5-40 mL IntraVENous Q8H    sodium chloride (NS) flush 5-40 mL  5-40 mL IntraVENous PRN    acetaminophen (TYLENOL) tablet 650 mg  650 mg Oral Q4H PRN    naloxone (NARCAN) injection 0.4 mg  0.4 mg IntraVENous PRN    diphenhydrAMINE (BENADRYL) capsule 25 mg  25 mg Oral Q6H PRN    ondansetron (ZOFRAN) injection 4 mg  4 mg IntraVENous Q4H PRN    bisacodyl (DULCOLAX) tablet 5 mg  5 mg Oral DAILY PRN    heparin (porcine) injection 5,000 Units  5,000 Units SubCUTAneous Q8H    albuterol (PROVENTIL VENTOLIN) nebulizer solution 2.5 mg  2.5 mg Nebulization Q4H PRN    tuberculin injection 5 Units  5 Units IntraDERMal ONCE    ciprofloxacin (CIPRO) 400 mg in D5W IVPB (premix)  400 mg IntraVENous Q12H       Other Studies (last 24 hours):  Xr Chest Pa Lat    Result Date: 11/19/2019  CHEST X-RAY, 2 views. HISTORY:  Confusion. TECHNIQUE: PA and lateral views. COMPARISON: May 2013. FINDINGS: *Lungs: Inspiration is shallow. Lungs are grossly clear. . *Heart size: is normal. *Costophrenic angles: are sharp. *Pulmonary vasculature: is unremarkable. *Included portion of the upper abdomen: is unremarkable. *Bones: No gross bony lesions. *Other: None. IMPRESSION: Negative for acute abnormality. Xr Femur Rt 2 Vs    Result Date: 11/19/2019  Right femur Clinical location: Right leg pain after a fall today FINDINGS: The bones are osteopenic. An age-indeterminate avulsion fracture is noted at the greater trochanter.  The remainder of the femur is intact. IMPRESSION: Age-indeterminate avulsion fracture of the greater trochanter. Diffuse osteopenia. Would consider further evaluation with right hip MRI to better evaluate for occult fracture. Ct Head Wo Cont    Result Date: 11/19/2019  CT HEAD WITHOUT CONTRAST. INDICATION: Head injury sustained in fall. COMPARISON: May 2013. TECHNIQUE:   5 mm axial scans from the skull base to the vertex. Our CT scanners use one or more of the following:  Automated exposure control, adjustment of the mA and or kV according to patient size, iterative reconstruction. FINDINGS:  No acute intraparenchymal hemorrhage or abnormal extra-axial fluid collection. The ventricles are normal size. No midline shift or mass effect. Mild white matter low attenuation is present, nonspecific, likely chronic small vessel disease. Included portion of the paranasal sinuses and orbits grossly unremarkable. No skull fracture. IMPRESSION:  Negative for acute intracranial abnormality. Chronic changes. Ct Chest W Cont    Result Date: 11/20/2019  CTA OF THE CHEST - PE STUDY HISTORY: Hypoxia COMPARISON: None TECHNIQUE: A helical acquisition was performed through the chest utilizing 3.12WA slice thickness during the infusion of 100 cc of Isovue-370. 3-D post-processed images were created on an independent workstation. Multiplanar reformats were obtained. The exam was focused on the pulmonary arteries. Dose reduction techniques used: Automated exposure control, adjustment of the mAs and/or kVp according to patient's size, and iterative reconstruction techniques. FINDINGS: *  PULMONARY VESSELS: No evidence of pulmonary embolism. *  PLEURA / PERICARDIUM: Small right pleural effusion. *  TOREY / MEDIASTINUM: Within normal limits. *  LUNGS: COPD. Bibasilar airspace disease right worse than left. *  TRACHEOBRONCHIAL TREE: Within normal limits. *  AORTA: Within normal limits. *  CORONARY ARTERIES: No coronary artery calcification is seen.  * CHEST WALL/AXILLA: Within normal limits. *  VISUALIZED UPPER ABDOMEN: Within normal limits. *  SPINE / BONES: Within normal limits. *  ADDITIONAL COMMENTS: None. IMPRESSION: No evidence of pulmonary embolism. Are graph COPD. Bibasilar atelectasis or pneumonia right worse than left. Small right pleural effusion.  Date of Dictation: 11/20/2019 12:24 AM      Assessment and Plan:     Hospital Problems as of 11/20/2019 Date Reviewed: 9/19/2017          Codes Class Noted - Resolved POA    * (Principal) Hypoxia ICD-10-CM: R09.02  ICD-9-CM: 799.02  11/20/2019 - Present Yes        Acute metabolic encephalopathy MALCOM-42-SK: G93.41  ICD-9-CM: 348.31  11/20/2019 - Present Yes        Abnormal x-ray ICD-10-CM: R93.89  ICD-9-CM: 793.99  11/20/2019 - Present Yes        UTI (urinary tract infection) ICD-10-CM: N39.0  ICD-9-CM: 599.0  11/20/2019 - Present Yes        HTN (hypertension) (Chronic) ICD-10-CM: I10  ICD-9-CM: 401.9  12/23/2010 - Present Yes              Plan:  · Hypoxia: possibly related to atelectasis, wean O2 as tolerant, add incentive spirometer, check ECHO  · Fall with head laceration: PT/OT once tolerant   · Possible right  avulsion fracture of the greater trochanter: recommended MRI right hip that daughter declined per nursing, daughter Estuardo Taylor states that patient had a MRI [phone] and cleared for weight bearing due to recent fracture,  will obtain prior MRI report from outlying facility, reviewed outpatient ortho records from Dr. Zachary Bell office and noted she should have been nonweight bearing until 8-21-19, now cleared by outpatient ortho for WBAT, will consult PT/OT  · Acute metabolic encephalopathy: unsure of mentation baseline and will followup , check TSH  · UTI: start cipro and followup culture  , has PCN allergy  · HTN: continued norvasc, cozaar, metoprolol    DC planning/Dispo:  Pending  Back to SNF      Diet:  DIET REGULAR  DVT ppx:  heparin    Signed:  Bertram Qureshi MD

## 2019-11-20 NOTE — PROGRESS NOTES
Problem: Falls - Risk of  Goal: *Absence of Falls  Description  Document Carmela Sandra Fall Risk and appropriate interventions in the flowsheet. Outcome: Progressing Towards Goal  Note: Fall Risk Interventions:  Mobility Interventions: Bed/chair exit alarm    Mentation Interventions: Bed/chair exit alarm    Medication Interventions: Bed/chair exit alarm    Elimination Interventions: Call light in reach, Bed/chair exit alarm              Problem: Patient Education: Go to Patient Education Activity  Goal: Patient/Family Education  Outcome: Progressing Towards Goal     Problem: Pressure Injury - Risk of  Goal: *Prevention of pressure injury  Description  Document Jr Scale and appropriate interventions in the flowsheet.   Outcome: Progressing Towards Goal  Note: Pressure Injury Interventions:  Sensory Interventions: Assess changes in LOC    Moisture Interventions: Absorbent underpads    Activity Interventions: Pressure redistribution bed/mattress(bed type), PT/OT evaluation    Mobility Interventions: Pressure redistribution bed/mattress (bed type)    Nutrition Interventions: Document food/fluid/supplement intake                     Problem: Patient Education: Go to Patient Education Activity  Goal: Patient/Family Education  Outcome: Progressing Towards Goal

## 2019-11-20 NOTE — PROGRESS NOTES
Talk to Pts daughter regarding the MRI screening form and consent. She refused MRI for her mom, witnesses by [place].

## 2019-11-20 NOTE — DISCHARGE INSTRUCTIONS
Urinary Tract Infection in Women: Care Instructions  Your Care Instructions    A urinary tract infection, or UTI, is a general term for an infection anywhere between the kidneys and the urethra (where urine comes out). Most UTIs are bladder infections. They often cause pain or burning when you urinate. UTIs are caused by bacteria and can be cured with antibiotics. Be sure to complete your treatment so that the infection goes away. Follow-up care is a key part of your treatment and safety. Be sure to make and go to all appointments, and call your doctor if you are having problems. It's also a good idea to know your test results and keep a list of the medicines you take. How can you care for yourself at home? · Take your antibiotics as directed. Do not stop taking them just because you feel better. You need to take the full course of antibiotics. · Drink extra water and other fluids for the next day or two. This may help wash out the bacteria that are causing the infection. (If you have kidney, heart, or liver disease and have to limit fluids, talk with your doctor before you increase your fluid intake.)  · Avoid drinks that are carbonated or have caffeine. They can irritate the bladder. · Urinate often. Try to empty your bladder each time. · To relieve pain, take a hot bath or lay a heating pad set on low over your lower belly or genital area. Never go to sleep with a heating pad in place. To prevent UTIs  · Drink plenty of water each day. This helps you urinate often, which clears bacteria from your system. (If you have kidney, heart, or liver disease and have to limit fluids, talk with your doctor before you increase your fluid intake.)  · Urinate when you need to. · Urinate right after you have sex. · Change sanitary pads often. · Avoid douches, bubble baths, feminine hygiene sprays, and other feminine hygiene products that have deodorants.   · After going to the bathroom, wipe from front to back.  When should you call for help? Call your doctor now or seek immediate medical care if:    · Symptoms such as fever, chills, nausea, or vomiting get worse or appear for the first time.     · You have new pain in your back just below your rib cage. This is called flank pain.     · There is new blood or pus in your urine.     · You have any problems with your antibiotic medicine.    Watch closely for changes in your health, and be sure to contact your doctor if:    · You are not getting better after taking an antibiotic for 2 days.     · Your symptoms go away but then come back. Where can you learn more? Go to http://gaurav-judy.info/. Enter I346 in the search box to learn more about \"Urinary Tract Infection in Women: Care Instructions. \"  Current as of: December 19, 2018  Content Version: 12.2  © 9621-1442 [employer]. Care instructions adapted under license by [employer] (which disclaims liability or warranty for this information). If you have questions about a medical condition or this instruction, always ask your healthcare professional. [place] [address] any warranty or liability for your use of this information. Patient Education        Preventing Falls: Care Instructions  Your Care Instructions    Getting around your home safely can be a challenge if you have injuries or health problems that make it easy for you to fall. Loose rugs and furniture in walkways are among the dangers for many older people who have problems walking or who have poor eyesight. People who have conditions such as arthritis, osteoporosis, or dementia also have to be careful not to fall. You can make your home safer with a few simple measures. Follow-up care is a key part of your treatment and safety. Be sure to make and go to all appointments, and call your doctor if you are having problems.  It's also a good idea to know your test results and keep a list of the medicines you take. How can you care for yourself at home? Taking care of yourself  · You may get dizzy if you do not drink enough water. To prevent dehydration, drink plenty of fluids, enough so that your urine is light yellow or clear like water. Choose water and other caffeine-free clear liquids. If you have kidney, heart, or liver disease and have to limit fluids, talk with your doctor before you increase the amount of fluids you drink. · Exercise regularly to improve your strength, muscle tone, and balance. Walk if you can. Swimming may be a good choice if you cannot walk easily. · Have your vision and hearing checked each year or any time you notice a change. If you have trouble seeing and hearing, you might not be able to avoid objects and could lose your balance. · Know the side effects of the medicines you take. Ask your doctor or pharmacist whether the medicines you take can affect your balance. Sleeping pills or sedatives can affect your balance. · Limit the amount of alcohol you drink. Alcohol can impair your balance and other senses. · Ask your doctor whether calluses or corns on your feet need to be removed. If you wear loose-fitting shoes because of calluses or corns, you can lose your balance and fall. · Talk to your doctor if you have numbness in your feet. Preventing falls at home  · Remove raised doorway thresholds, throw rugs, and clutter. Repair loose carpet or raised areas in the floor. · Move furniture and electrical cords to keep them out of walking paths. · Use nonskid floor wax, and wipe up spills right away, especially on ceramic tile floors. · If you use a walker or cane, put rubber tips on it. If you use crutches, clean the bottoms of them regularly with an abrasive pad, such as steel wool. · Keep your house well lit, especially [place], and outside walkways. Use night-lights in areas such as hallways and bathrooms.  Add extra light switches or use remote switches (such as switches that go on or off when you clap your hands) to make it easier to turn lights on if you have to get up during the night. · Install sturdy handrails on stairways. · Move items in your cabinets so that the things you use a lot are on the lower shelves (about waist level). · Keep a cordless phone and a flashlight with new batteries by your bed. If possible, put a phone in each of the main rooms of your house, or carry a cell phone in case you fall and cannot reach a phone. Or, you can wear a device around your neck or wrist. You push a button that sends a signal for help. · Wear low-heeled shoes that fit well and give your feet good support. Use footwear with nonskid soles. Check the heels and soles of your shoes for wear. Repair or replace worn heels or soles. · Do not wear socks without shoes on wood floors. · Walk on the grass when the sidewalks are slippery. If you live in an area that gets snow and ice in the winter, sprinkle salt on slippery steps and sidewalks. Preventing falls in the bath  · Install grab bars and nonskid mats inside and outside your shower or tub and near the toilet and sinks. · Use shower chairs and bath benches. · Use a hand-held shower head that will allow you to sit while showering. · Get into a tub or shower by putting the weaker leg in first. Get out of a tub or shower with your strong side first.  · Repair loose toilet seats and consider installing a raised toilet seat to make getting on and off the toilet easier. · Keep your bathroom door unlocked while you are in the shower. Where can you learn more? Go to http://gaurav-judy.info/. Enter 0476 [id number] in the search box to learn more about \"Preventing Falls: Care Instructions. \"  Current as of: March 16, 2018  Content Version: 11.8  © 3311-4727 [employer]. Care instructions adapted under license by [employer] (which disclaims liability or warranty for this information). If you have questions about a medical condition or this instruction, always ask your healthcare professional. [address] any warranty or liability for your use of this information. Patient Education        Cuts: Care Instructions  Your Care Instructions  A cut can happen anywhere on your body. Stitches, staples, skin adhesives, or pieces of tape called Steri-Strips are sometimes used to keep the edges of a cut together and help it heal. Steri-Strips can be used by themselves or with stitches or staples. Sometimes cuts are left open. If the cut went deep and through the skin, the doctor may have closed the cut in two layers. A deeper layer of stitches brings the deep part of the cut together. These stitches will dissolve and don't need to be removed. The upper layer closure, which could be stitches, staples, Steri-Strips, or adhesive, is what you see on the cut. A cut is often covered by a bandage. The doctor has checked you carefully, but problems can develop later. If you notice any problems or new symptoms, get medical treatment right away. Follow-up care is a key part of your treatment and safety. Be sure to make and go to all appointments, and call your doctor if you are having problems. It's also a good idea to know your test results and keep a list of the medicines you take. How can you care for yourself at home? If a cut is open or closed  · Prop up the sore area on a pillow anytime you sit or lie down during the next 3 days. Try to keep it above the level of your heart. This will help reduce swelling. · Keep the cut dry for the first 24 to 48 hours. After this, you can shower if your doctor okays it. Pat the cut dry. · Don't soak the cut, such as in a bathtub. Your doctor will tell you when it's safe to get the cut wet. · After the first 24 to 48 hours, clean the cut with soap and water 2 times a day unless your doctor gives you different instructions.   ? Don't use hydrogen peroxide or alcohol, which can slow healing. ? You may cover the cut with a thin layer of petroleum jelly and a nonstick bandage. ? If the doctor put a bandage over the cut, put on a new bandage after cleaning the cut or if the bandage gets wet or dirty. · Avoid any activity that could cause your cut to reopen. · Be safe with medicines. Read and follow all instructions on the label. ? If the doctor gave you a prescription medicine for pain, take it as prescribed. ? If you are not taking a prescription pain medicine, ask your doctor if you can take an over-the-counter medicine. If the cut is closed with stitches, staples, or Steri-Strips  · Follow the above instructions for open or closed cuts. · Do not remove the stitches or staples on your own. Your doctor will tell you when to come back to have the stitches or staples removed. · Leave Steri-Strips on until they fall off. If the cut is closed with a skin adhesive  · Follow the above instructions for open or closed cuts. · Leave the skin adhesive on your skin until it falls off on its own. This may take 5 to 10 days. · Do not scratch, rub, or pick at the adhesive. · Do not put the sticky part of a bandage directly on the adhesive. · Do not put any kind of ointment, cream, or lotion over the area. This can make the adhesive fall off too soon. Do not use hydrogen peroxide or alcohol, which can slow healing. When should you call for help? Call your doctor now or seek immediate medical care if:    · You have new pain, or your pain gets worse.     · The skin near the cut is cold or pale or changes color.     · You have tingling, weakness, or numbness near the cut.     · The cut starts to bleed, and blood soaks through the bandage. Oozing small amounts of blood is normal.     · You have trouble moving the area near the cut.     · You have symptoms of infection, such as:  ? Increased pain, swelling, warmth, or redness around the cut.  ?  Red streaks leading from the cut.  ? Pus draining from the cut.  ? A fever.    Watch closely for changes in your health, and be sure to contact your doctor if:    · The cut reopens.     · You do not get better as expected. Where can you learn more? Go to http://gaurav-judy.info/. Enter M735 in the search box to learn more about \"Cuts: Care Instructions. \"  Current as of: June 26, 2019  Content Version: 12.2  © 3773-3361 [employer]. Care instructions adapted under license by [employer] (which disclaims liability or warranty for this information). If you have questions about a medical condition or this instruction, always ask your healthcare professional. [address] any warranty or liability for your use of this information. Patient Education        Cuts Closed With Stitches: Care Instructions  Your Care Instructions  A cut can happen anywhere on your body. The doctor used stitches to close the cut. Using stitches also helps the cut heal and reduces scarring. Sometimes pieces of tape called Steri-Strips are put over the stitches. If the cut went deep and through the skin, the doctor may have put in two layers of stitches. The deeper layer brings the deep part of the cut together. These stitches will dissolve and don't need to be removed. The stitches in the upper layer are the ones you see on the cut. You will probably have a bandage over the stitches. You will need to have the stitches removed, usually in 7 to 14 days. The doctor has checked you carefully, but problems can develop later. If you notice any problems or new symptoms, get medical treatment right away. Follow-up care is a key part of your treatment and safety. Be sure to make and go to all appointments, and call your doctor if you are having problems. It's also a good idea to know your test results and keep a list of the medicines you take.   How can you care for yourself at home?  · Keep the cut dry for the first 24 to 48 hours. After this, you can shower if your doctor okays it. Pat the cut dry. · Don't soak the cut, such as in a bathtub. Your doctor will tell you when it's safe to get the cut wet. · If your doctor told you how to care for your cut, follow your doctor's instructions. If you did not get instructions, follow this general advice:  ? After the first 24 to 48 hours, wash around the cut with clean water 2 times a day. Don't use hydrogen peroxide or alcohol, which can slow healing. ? You may cover the cut with a thin layer of petroleum jelly, such as Vaseline, and a nonstick bandage. ? Apply more petroleum jelly and replace the bandage as needed. · Prop up the sore area on a pillow anytime you sit or lie down during the next 3 days. Try to keep it above the level of your heart. This will help reduce swelling. · Avoid any activity that could cause your cut to reopen. · Do not remove the stitches on your own. Your doctor will tell you when to come back to have the stitches removed. · Leave Steri-Strips on until they fall off. · Be safe with medicines. Read and follow all instructions on the label. ? If the doctor gave you a prescription medicine for pain, take it as prescribed. ? If you are not taking a prescription pain medicine, ask your doctor if you can take an over-the-counter medicine. When should you call for help? Call your doctor now or seek immediate medical care if:    · You have new pain, or your pain gets worse.     · The skin near the cut is cold or pale or changes color.     · You have tingling, weakness, or numbness near the cut.     · The cut starts to bleed, and blood soaks through the bandage. Oozing small amounts of blood is normal.     · You have trouble moving the area near the cut.     · You have symptoms of infection, such as:  ? Increased pain, swelling, warmth, or redness around the cut.  ? Red streaks leading from the cut.  ?  Pus draining from the cut.  ? A fever.    Watch closely for changes in your health, and be sure to contact your doctor if:    · The cut reopens.     · You do not get better as expected. Where can you learn more? Go to http://gaurav-judy.info/. Enter R217 in the search box to learn more about \"Cuts Closed With Stitches: Care Instructions. \"  Current as of: June 26, 2019  Content Version: 12.2  © 3066-2946 [employer]. Care instructions adapted under license by [employer] (which disclaims liability or warranty for this information). If you have questions about a medical condition or this instruction, always ask your healthcare professional. [address] any warranty or liability for your use of this information.

## 2019-11-20 NOTE — INTERDISCIPLINARY ROUNDS
Interdisciplinary team rounds were held 11/20/2019 with the following team members:  Care Management, Physical Therapy, Physician and . Plan of care discussed. See clinical pathway and/or care plan for interventions and desired outcomes.

## 2019-11-20 NOTE — WOUND CARE
Patient seen for reported feet and sacral skin issues. Sacrum clear. Some brown staining to top of gluteal fold consistent with longer term moisture. Wearing brief. Recommend removing brief and just using underpad and barrier creams. Heel boots in the room, placed them at this time. Feet clear. Left lateral ankle has dry pink almost healed wound 0.5cm that does not need a dressing at this time. Noted tibial brown scab/scrapes that do not need a dressing. Recommend use of moisturizing cream after bathing. Keep turned frequently. NO pillows to be packed behind sacrum please. Will sign off.

## 2019-11-20 NOTE — PROGRESS NOTES
11/20/19 0322   Dual Skin Pressure Injury Assessment   Dual Skin Pressure Injury Assessment X   Second Care Provider (Based on Facility Policy) Bam,RN   Sacrum  Mid   Heel  Bilateral  (boggy)   Elbows Right   Toes Left Toes;Right Toes   Occipital Scalp   Skin Integumentary   Skin Integumentary (WDL) X   Skin Color Appropriate for ethnicity; Red   Skin Condition/Temp Dry; Warm   Skin Integrity Abrasion; Excoriation; Other (comment)  (Ecchomosis,)   Turgor Epidermis thin w/ loss of subcut tissue   Hair Growth Present   Varicosities Absent    Pressure  Injury Documentation Pressure Injury Noted-See Wound LDA to Document   Wound Prevention and Protection Methods   Orientation of Wound Prevention Posterior   Location of Wound Prevention Sacrum/Coccyx   Dressing Present  Yes   Dressing Status Changed   Wound Offloading (Prevention Methods) Bed, pressure reduction mattress;Pillows;Repositioning

## 2019-11-21 LAB
ANION GAP SERPL CALC-SCNC: 7 MMOL/L (ref 7–16)
BASOPHILS # BLD: 0 K/UL (ref 0–0.2)
BASOPHILS NFR BLD: 0 % (ref 0–2)
BUN SERPL-MCNC: 21 MG/DL (ref 8–23)
CALCIUM SERPL-MCNC: 8.8 MG/DL (ref 8.3–10.4)
CHLORIDE SERPL-SCNC: 106 MMOL/L (ref 98–107)
CO2 SERPL-SCNC: 30 MMOL/L (ref 21–32)
CREAT SERPL-MCNC: 0.86 MG/DL (ref 0.6–1)
DIFFERENTIAL METHOD BLD: ABNORMAL
EOSINOPHIL # BLD: 0.6 K/UL (ref 0–0.8)
EOSINOPHIL NFR BLD: 7 % (ref 0.5–7.8)
ERYTHROCYTE [DISTWIDTH] IN BLOOD BY AUTOMATED COUNT: 13.3 % (ref 11.9–14.6)
GLUCOSE SERPL-MCNC: 95 MG/DL (ref 65–100)
HCT VFR BLD AUTO: 37.6 % (ref 35.8–46.3)
HGB BLD-MCNC: 12.4 G/DL (ref 11.7–15.4)
IMM GRANULOCYTES # BLD AUTO: 0.1 K/UL (ref 0–0.5)
IMM GRANULOCYTES NFR BLD AUTO: 1 % (ref 0–5)
LYMPHOCYTES # BLD: 1.3 K/UL (ref 0.5–4.6)
LYMPHOCYTES NFR BLD: 15 % (ref 13–44)
MCH RBC QN AUTO: 34.4 PG (ref 26.1–32.9)
MCHC RBC AUTO-ENTMCNC: 33 G/DL (ref 31.4–35)
MCV RBC AUTO: 104.4 FL (ref 79.6–97.8)
MM INDURATION POC: 0 MM (ref 0–5)
MONOCYTES # BLD: 0.8 K/UL (ref 0.1–1.3)
MONOCYTES NFR BLD: 10 % (ref 4–12)
NEUTS SEG # BLD: 5.8 K/UL (ref 1.7–8.2)
NEUTS SEG NFR BLD: 68 % (ref 43–78)
NRBC # BLD: 0 K/UL (ref 0–0.2)
PLATELET # BLD AUTO: 225 K/UL (ref 150–450)
PMV BLD AUTO: 9.3 FL (ref 9.4–12.3)
POTASSIUM SERPL-SCNC: 3.9 MMOL/L (ref 3.5–5.1)
PPD POC: NEGATIVE NEGATIVE
RBC # BLD AUTO: 3.6 M/UL (ref 4.05–5.2)
SODIUM SERPL-SCNC: 143 MMOL/L (ref 136–145)
TSH SERPL DL<=0.005 MIU/L-ACNC: 1.27 UIU/ML (ref 0.36–3.74)
WBC # BLD AUTO: 8.5 K/UL (ref 4.3–11.1)

## 2019-11-21 PROCEDURE — 85025 COMPLETE CBC W/AUTO DIFF WBC: CPT

## 2019-11-21 PROCEDURE — 84443 ASSAY THYROID STIM HORMONE: CPT

## 2019-11-21 PROCEDURE — 74011250637 HC RX REV CODE- 250/637: Performed by: FAMILY MEDICINE

## 2019-11-21 PROCEDURE — 36415 COLL VENOUS BLD VENIPUNCTURE: CPT

## 2019-11-21 PROCEDURE — 74011250636 HC RX REV CODE- 250/636: Performed by: FAMILY MEDICINE

## 2019-11-21 PROCEDURE — 96372 THER/PROPH/DIAG INJ SC/IM: CPT

## 2019-11-21 PROCEDURE — 74011250636 HC RX REV CODE- 250/636: Performed by: INTERNAL MEDICINE

## 2019-11-21 PROCEDURE — 74011250637 HC RX REV CODE- 250/637: Performed by: INTERNAL MEDICINE

## 2019-11-21 PROCEDURE — 99218 HC RM OBSERVATION: CPT

## 2019-11-21 PROCEDURE — 96366 THER/PROPH/DIAG IV INF ADDON: CPT

## 2019-11-21 PROCEDURE — 80048 BASIC METABOLIC PNL TOTAL CA: CPT

## 2019-11-21 RX ADMIN — AMLODIPINE BESYLATE 2.5 MG: 5 TABLET ORAL at 09:06

## 2019-11-21 RX ADMIN — METOPROLOL SUCCINATE 50 MG: 50 TABLET, EXTENDED RELEASE ORAL at 09:06

## 2019-11-21 RX ADMIN — Medication 1 AMPULE: at 09:05

## 2019-11-21 RX ADMIN — HEPARIN SODIUM 5000 UNITS: 5000 INJECTION INTRAVENOUS; SUBCUTANEOUS at 05:47

## 2019-11-21 RX ADMIN — CIPROFLOXACIN 400 MG: 2 INJECTION, SOLUTION INTRAVENOUS at 09:05

## 2019-11-21 RX ADMIN — ATORVASTATIN CALCIUM 10 MG: 10 TABLET, FILM COATED ORAL at 21:56

## 2019-11-21 RX ADMIN — PANTOPRAZOLE SODIUM 40 MG: 40 TABLET, DELAYED RELEASE ORAL at 05:47

## 2019-11-21 RX ADMIN — ASPIRIN 81 MG: 81 TABLET ORAL at 09:06

## 2019-11-21 RX ADMIN — LOSARTAN POTASSIUM 100 MG: 50 TABLET, FILM COATED ORAL at 09:05

## 2019-11-21 RX ADMIN — FAMOTIDINE 20 MG: 20 TABLET ORAL at 09:06

## 2019-11-21 RX ADMIN — HEPARIN SODIUM 5000 UNITS: 5000 INJECTION INTRAVENOUS; SUBCUTANEOUS at 21:57

## 2019-11-21 RX ADMIN — Medication 1 AMPULE: at 21:56

## 2019-11-21 RX ADMIN — Medication 10 ML: at 05:48

## 2019-11-21 RX ADMIN — LEVOTHYROXINE SODIUM 25 MCG: 50 TABLET ORAL at 05:47

## 2019-11-21 RX ADMIN — Medication 10 ML: at 21:57

## 2019-11-21 RX ADMIN — POTASSIUM CHLORIDE 10 MEQ: 10 TABLET, EXTENDED RELEASE ORAL at 09:05

## 2019-11-21 RX ADMIN — FUROSEMIDE 20 MG: 20 TABLET ORAL at 09:05

## 2019-11-21 RX ADMIN — MAGNESIUM HYDROXIDE 15 ML: 400 SUSPENSION ORAL at 21:57

## 2019-11-21 RX ADMIN — HEPARIN SODIUM 5000 UNITS: 5000 INJECTION INTRAVENOUS; SUBCUTANEOUS at 14:33

## 2019-11-21 RX ADMIN — ACETAMINOPHEN 650 MG: 325 TABLET, FILM COATED ORAL at 05:47

## 2019-11-21 NOTE — PROGRESS NOTES
Problem: Falls - Risk of  Goal: *Absence of Falls  Description  Document Renetta Ruts Fall Risk and appropriate interventions in the flowsheet. Outcome: Progressing Towards Goal  Note: Fall Risk Interventions:  Mobility Interventions: Bed/chair exit alarm, OT consult for ADLs, Patient to call before getting OOB, PT Consult for mobility concerns    Mentation Interventions: Adequate sleep, hydration, pain control, Bed/chair exit alarm, Door open when patient unattended    Medication Interventions: Bed/chair exit alarm, Patient to call before getting OOB, Teach patient to arise slowly    Elimination Interventions: Bed/chair exit alarm, Call light in reach, Patient to call for help with toileting needs, Toileting schedule/hourly rounds    History of Falls Interventions: Bed/chair exit alarm, Consult care management for discharge planning, Door open when patient unattended         Problem: Patient Education: Go to Patient Education Activity  Goal: Patient/Family Education  Outcome: Progressing Towards Goal     Problem: Pressure Injury - Risk of  Goal: *Prevention of pressure injury  Description  Document Jr Scale and appropriate interventions in the flowsheet.   Outcome: Progressing Towards Goal  Note: Pressure Injury Interventions:  Sensory Interventions: Assess changes in LOC, Check visual cues for pain, Keep linens dry and wrinkle-free    Moisture Interventions: Absorbent underpads, Apply protective barrier, creams and emollients, Check for incontinence Q2 hours and as needed    Activity Interventions: Increase time out of bed, PT/OT evaluation    Mobility Interventions: HOB 30 degrees or less, PT/OT evaluation    Nutrition Interventions: Document food/fluid/supplement intake, Discuss nutritional consult with provider, Offer support with meals,snacks and hydration    Friction and Shear Interventions: Apply protective barrier, creams and emollients, Foam dressings/transparent film/skin sealants                Problem: Patient Education: Go to Patient Education Activity  Goal: Patient/Family Education  Outcome: Progressing Towards Goal

## 2019-11-21 NOTE — PROGRESS NOTES
Physical Therapy Note:    PT evaluation orders received and chart reviewed. Patient admitted s/p fall. Findings of age indeterminate avulsion fracture of right greater trochanter. MD recommending MRI. Discussed with primary RN. Will hold mobility assessment until further clarification on weight bearing status. Will discuss with MD during ITD rounding. Will follow.  Thank you,    Chad Gardiner, PT, DPT  11/21/19 9:35AM

## 2019-11-21 NOTE — PROGRESS NOTES
Hospitalist Progress Note     Admit Date:  2019  4:09 PM   Name:  Mayra Young   Age:  80 y.o.  :  1923   MRN:  [id number]   PCP:  Zander Carlisle MD  Treatment Team: Attending Provider: Susan Chau MD; Primary Nurse: Binta Chiang, RN; Care Manager: Anjel Marion, RN; Utilization Review: Lyndle Duane, RN     CC: Reason for admission is: hypoxia  Patient is a 80 y.o. female who presents to the ER due to a fall at her SNF in which she injured her right forearm and a laceration to her scalp as well. EMS reported that she was at her mental baseline. Work-up in the emergency room unfortunately showed her to be hypoxic, at one point with a room air sat of 77%. She has required O2 supplementation to keep her O2 saturations up. Chest x-ray was unrevealing. CT did suggest COPD and/or atelectasis. Subjective:     2019hospital bed while she was getting personal care. She denied any shortness of breath or cough, and room air. On exam she has faint bilateral basilar crackles. Noted to have a temperature 100.5 earlier this morning  She denied any nausea no vomiting or abdominal pain.         Objective:     Patient Vitals for the past 24 hrs:   Temp Pulse Resp BP SpO2   19 1508 97.8 °F (36.6 °C) 65 17 113/55 98 %   19 1200 97.5 °F (36.4 °C) 67 17 123/60 95 %   19 0745 97.4 °F (36.3 °C) 61 16 118/46 95 %   19 0430 (!) 100.5 °F (38.1 °C) 71 16 98/40 94 %   19 0013 98.1 °F (36.7 °C) 77 16 120/50 93 %   19 98.2 °F (36.8 °C) 80 18 119/54 90 %     Oxygen Therapy  O2 Sat (%): 98 % (19 1508)  Pulse via Oximetry: 81 beats per minute (191)  O2 Device: Nasal cannula (19)  O2 Flow Rate (L/min): 2 l/min (19)    Intake/Output Summary (Last 24 hours) at 2019 1755  Last data filed at 2019 1404  Gross per 24 hour   Intake 240 ml   Output    Net 240 ml           General appearance: Apparent respiratory distress or pain  Eyes: anicteric sclerae, moist conjunctivae; no lid-lag  ENT: Atraumatic; oropharynx clear with moist mucous membranes and no mucosal ulcerations; normal hard and soft palate  Neck: Trachea midline   FROM, supple, no thyromegaly or lymphadenopathy  Lungs: Normal work of breathing,  faint bilateral basal crackles  CV: S1,S2 present,   Abdomen: Soft, non-tender; no masses   Extremities: No peripheral edema or extremity lymphadenopathy  Skin: Normal temperature, turgor and texture; no subcutaneous nodules  Psych: Appropriate affect,     Capillary refill is good.  Has 2+ radial pulse.  Skin is warm and dry.  Color is good. Data Review:  I have reviewed all labs, meds, telemetry events, and studies from the last 24 hours. Recent Results (from the past 24 hour(s))   METABOLIC PANEL, BASIC    Collection Time: 11/21/19  6:08 AM   Result Value Ref Range    Sodium 143 136 - 145 mmol/L    Potassium 3.9 3.5 - 5.1 mmol/L    Chloride 106 98 - 107 mmol/L    CO2 30 21 - 32 mmol/L    Anion gap 7 7 - 16 mmol/L    Glucose 95 65 - 100 mg/dL    BUN 21 8 - 23 MG/DL    Creatinine 0.86 0.6 - 1.0 MG/DL    GFR est AA >60 >60 ml/min/1.73m2    GFR est non-AA >60 >60 ml/min/1.73m2    Calcium 8.8 8.3 - 10.4 MG/DL   CBC WITH AUTOMATED DIFF    Collection Time: 11/21/19  6:08 AM   Result Value Ref Range    WBC 8.5 4.3 - 11.1 K/uL    RBC 3.60 (L) 4.05 - 5.2 M/uL    HGB 12.4 11.7 - 15.4 g/dL    HCT 37.6 35.8 - 46.3 %    .4 (H) 79.6 - 97.8 FL    MCH 34.4 (H) 26.1 - 32.9 PG    MCHC 33.0 31.4 - 35.0 g/dL    RDW 13.3 11.9 - 14.6 %    PLATELET 077 540 - 196 K/uL    MPV 9.3 (L) 9.4 - 12.3 FL    ABSOLUTE NRBC 0.00 0.0 - 0.2 K/uL    DF AUTOMATED      NEUTROPHILS 68 43 - 78 %    LYMPHOCYTES 15 13 - 44 %    MONOCYTES 10 4.0 - 12.0 %    EOSINOPHILS 7 0.5 - 7.8 %    BASOPHILS 0 0.0 - 2.0 %    IMMATURE GRANULOCYTES 1 0.0 - 5.0 %    ABS. NEUTROPHILS 5.8 1.7 - 8.2 K/UL    ABS.  LYMPHOCYTES 1.3 0.5 - 4.6 K/UL ABS. MONOCYTES 0.8 0.1 - 1.3 K/UL    ABS. EOSINOPHILS 0.6 0.0 - 0.8 K/UL    ABS. BASOPHILS 0.0 0.0 - 0.2 K/UL    ABS. IMM.  GRANS. 0.1 0.0 - 0.5 K/UL   TSH 3RD GENERATION    Collection Time: 11/21/19  6:08 AM   Result Value Ref Range    TSH 1.270 0.358 - 3.740 uIU/mL   PLEASE READ & DOCUMENT PPD TEST IN 24 HRS    Collection Time: 11/21/19  9:54 AM   Result Value Ref Range    PPD Negative Negative    mm Induration 0 0 - 5 mm        All Micro Results     Procedure Component Value Units Date/Time    CULTURE, URINE [[id number]]  (Abnormal) Collected:  11/19/19 1700    Order Status:  Completed Specimen:  Cath Urine Updated:  11/21/19 0738     Special Requests: NO SPECIAL REQUESTS        Culture result:       >100,000 COLONIES/mL GRAM NEGATIVE RODS IDENTIFICATION AND SUSCEPTIBILITY TO FOLLOW                Current Meds:  Current Facility-Administered Medications   Medication Dose Route Frequency    amLODIPine (NORVASC) tablet 2.5 mg  2.5 mg Oral DAILY    aspirin delayed-release tablet 81 mg  81 mg Oral DAILY    atorvastatin (LIPITOR) tablet 10 mg  10 mg Oral QHS    famotidine (PEPCID) tablet 20 mg  20 mg Oral DAILY    furosemide (LASIX) tablet 20 mg  20 mg Oral DAILY    levothyroxine (SYNTHROID) tablet 25 mcg  25 mcg Oral ACB    losartan (COZAAR) tablet 100 mg  100 mg Oral DAILY    magnesium hydroxide (MILK OF MAGNESIA) 400 mg/5 mL oral suspension 15 mL  15 mL Oral QHS    metoprolol succinate (TOPROL-XL) XL tablet 50 mg  50 mg Oral DAILY    pantoprazole (PROTONIX) tablet 40 mg  40 mg Oral ACB    potassium chloride (KLOR-CON) tablet 10 mEq  10 mEq Oral DAILY    sodium chloride (NS) flush 5-40 mL  5-40 mL IntraVENous Q8H    sodium chloride (NS) flush 5-40 mL  5-40 mL IntraVENous PRN    acetaminophen (TYLENOL) tablet 650 mg  650 mg Oral Q4H PRN    naloxone (NARCAN) injection 0.4 mg  0.4 mg IntraVENous PRN    diphenhydrAMINE (BENADRYL) capsule 25 mg  25 mg Oral Q6H PRN    ondansetron (ZOFRAN) injection 4 mg  4 mg IntraVENous Q4H PRN    bisacodyl (DULCOLAX) tablet 5 mg  5 mg Oral DAILY PRN    heparin (porcine) injection 5,000 Units  5,000 Units SubCUTAneous Q8H    albuterol (PROVENTIL VENTOLIN) nebulizer solution 2.5 mg  2.5 mg Nebulization Q4H PRN    ciprofloxacin (CIPRO) 400 mg in D5W IVPB (premix)  400 mg IntraVENous Q24H    alcohol 62% (NOZIN) nasal  1 Ampule  1 Ampule Topical Q12H       Other Studies (last 24 hours):  No results found. Review of Systems:  Gen: No weight loss  Eyes: no vision changes  ENT: no sore throat  Resp: No dyspnea or cough  CV: No chest pain  Abd:  no abd pain, no vomiting or diarrhea  : No incontinence, no hematuria or dysuria, no flank pain  MSK: no leg swelling  Neuro: No HA or dizziness, no weakness, numbness/tingling    Assessment and Plan:     Hospital Problems as of 11/21/2019 Date Reviewed: 9/19/2017          Codes Class Noted - Resolved POA    * (Principal) Hypoxia ICD-10-CM: R09.02  ICD-9-CM: 799.02  11/20/2019 - Present Yes        Acute metabolic encephalopathy [facility]-61-ON: G93.41  ICD-9-CM: 348.31  11/20/2019 - Present Yes        Abnormal x-ray ICD-10-CM: R93.89  ICD-9-CM: 793.99  11/20/2019 - Present Yes        UTI (urinary tract infection) ICD-10-CM: N39.0  ICD-9-CM: 599.0  11/20/2019 - Present Yes        HTN (hypertension) (Chronic) ICD-10-CM: I10  ICD-9-CM: 401.9  12/23/2010 - Present Yes            2D echo LV ejection fraction 60 to 65%, also findings consistent with mild diastolic failure. PLAN:    Toxic etiology unclear on exam has faint bilateral basal crackles. /Temperature 100.5 degrees  earlier   Incentive spirometry  Recheck chest x-ray in a.m. BNP, procalcitonin  Empirically on ciprofloxacin  PRN oxygen, abortive care.   If continues to stay afebrile chest x-ray negative anticipate discharge in a.m.    DC planning/Dispo:    DVT ppx: Lovenox  CODE STATUS full code    Daughter and son-in-law present, they were updated and all questions answered.     Signed:  Misha Benavides MD

## 2019-11-22 ENCOUNTER — APPOINTMENT (OUTPATIENT)
Dept: GENERAL RADIOLOGY | Age: 84
End: 2019-11-22
Attending: INTERNAL MEDICINE
Payer: MEDICARE

## 2019-11-22 VITALS
HEART RATE: 71 BPM | BODY MASS INDEX: 17.48 KG/M2 | RESPIRATION RATE: 18 BRPM | WEIGHT: 95 LBS | DIASTOLIC BLOOD PRESSURE: 62 MMHG | SYSTOLIC BLOOD PRESSURE: 105 MMHG | HEIGHT: 62 IN | OXYGEN SATURATION: 91 % | TEMPERATURE: 98.1 F

## 2019-11-22 LAB
ANION GAP SERPL CALC-SCNC: 5 MMOL/L (ref 7–16)
BACTERIA SPEC CULT: ABNORMAL
BASOPHILS # BLD: 0 K/UL (ref 0–0.2)
BASOPHILS NFR BLD: 0 % (ref 0–2)
BNP SERPL-MCNC: 431 PG/ML
BUN SERPL-MCNC: 26 MG/DL (ref 8–23)
CALCIUM SERPL-MCNC: 8.2 MG/DL (ref 8.3–10.4)
CHLORIDE SERPL-SCNC: 106 MMOL/L (ref 98–107)
CO2 SERPL-SCNC: 31 MMOL/L (ref 21–32)
CREAT SERPL-MCNC: 0.78 MG/DL (ref 0.6–1)
DIFFERENTIAL METHOD BLD: ABNORMAL
EOSINOPHIL # BLD: 0.7 K/UL (ref 0–0.8)
EOSINOPHIL NFR BLD: 9 % (ref 0.5–7.8)
ERYTHROCYTE [DISTWIDTH] IN BLOOD BY AUTOMATED COUNT: 13.3 % (ref 11.9–14.6)
GLUCOSE SERPL-MCNC: 98 MG/DL (ref 65–100)
HCT VFR BLD AUTO: 34.3 % (ref 35.8–46.3)
HGB BLD-MCNC: 11.5 G/DL (ref 11.7–15.4)
IMM GRANULOCYTES # BLD AUTO: 0.1 K/UL (ref 0–0.5)
IMM GRANULOCYTES NFR BLD AUTO: 1 % (ref 0–5)
LYMPHOCYTES # BLD: 1.4 K/UL (ref 0.5–4.6)
LYMPHOCYTES NFR BLD: 19 % (ref 13–44)
MCH RBC QN AUTO: 34.8 PG (ref 26.1–32.9)
MCHC RBC AUTO-ENTMCNC: 33.5 G/DL (ref 31.4–35)
MCV RBC AUTO: 103.9 FL (ref 79.6–97.8)
MM INDURATION POC: 0 MM (ref 0–5)
MONOCYTES # BLD: 0.8 K/UL (ref 0.1–1.3)
MONOCYTES NFR BLD: 11 % (ref 4–12)
NEUTS SEG # BLD: 4.4 K/UL (ref 1.7–8.2)
NEUTS SEG NFR BLD: 60 % (ref 43–78)
NRBC # BLD: 0 K/UL (ref 0–0.2)
PLATELET # BLD AUTO: 213 K/UL (ref 150–450)
PMV BLD AUTO: 8.8 FL (ref 9.4–12.3)
POTASSIUM SERPL-SCNC: 3.7 MMOL/L (ref 3.5–5.1)
PPD POC: NEGATIVE NEGATIVE
PROCALCITONIN SERPL-MCNC: 0.1 NG/ML
RBC # BLD AUTO: 3.3 M/UL (ref 4.05–5.2)
SERVICE CMNT-IMP: ABNORMAL
SODIUM SERPL-SCNC: 142 MMOL/L (ref 136–145)
WBC # BLD AUTO: 7.4 K/UL (ref 4.3–11.1)

## 2019-11-22 PROCEDURE — 74011250636 HC RX REV CODE- 250/636: Performed by: FAMILY MEDICINE

## 2019-11-22 PROCEDURE — 92610 EVALUATE SWALLOWING FUNCTION: CPT

## 2019-11-22 PROCEDURE — 36415 COLL VENOUS BLD VENIPUNCTURE: CPT

## 2019-11-22 PROCEDURE — 97166 OT EVAL MOD COMPLEX 45 MIN: CPT

## 2019-11-22 PROCEDURE — 96372 THER/PROPH/DIAG INJ SC/IM: CPT

## 2019-11-22 PROCEDURE — 97161 PT EVAL LOW COMPLEX 20 MIN: CPT

## 2019-11-22 PROCEDURE — 74011250636 HC RX REV CODE- 250/636: Performed by: INTERNAL MEDICINE

## 2019-11-22 PROCEDURE — 80048 BASIC METABOLIC PNL TOTAL CA: CPT

## 2019-11-22 PROCEDURE — 74011250637 HC RX REV CODE- 250/637: Performed by: FAMILY MEDICINE

## 2019-11-22 PROCEDURE — 74011250637 HC RX REV CODE- 250/637: Performed by: INTERNAL MEDICINE

## 2019-11-22 PROCEDURE — 71046 X-RAY EXAM CHEST 2 VIEWS: CPT

## 2019-11-22 PROCEDURE — 96366 THER/PROPH/DIAG IV INF ADDON: CPT

## 2019-11-22 PROCEDURE — 83880 ASSAY OF NATRIURETIC PEPTIDE: CPT

## 2019-11-22 PROCEDURE — 85025 COMPLETE CBC W/AUTO DIFF WBC: CPT

## 2019-11-22 PROCEDURE — 97535 SELF CARE MNGMENT TRAINING: CPT

## 2019-11-22 PROCEDURE — 99218 HC RM OBSERVATION: CPT

## 2019-11-22 PROCEDURE — 84145 PROCALCITONIN (PCT): CPT

## 2019-11-22 PROCEDURE — 97530 THERAPEUTIC ACTIVITIES: CPT

## 2019-11-22 RX ORDER — FUROSEMIDE 20 MG/1
20 TABLET ORAL DAILY
Qty: 30 TAB | Refills: 0 | Status: SHIPPED | OUTPATIENT
Start: 2019-11-22

## 2019-11-22 RX ORDER — POTASSIUM CHLORIDE 750 MG/1
10 TABLET, EXTENDED RELEASE ORAL DAILY
Qty: 30 TAB | Refills: 0 | Status: SHIPPED | OUTPATIENT
Start: 2019-11-22 | End: 2019-12-22

## 2019-11-22 RX ORDER — CIPROFLOXACIN 500 MG/1
500 TABLET ORAL 2 TIMES DAILY
Qty: 4 TAB | Refills: 0 | Status: SHIPPED | OUTPATIENT
Start: 2019-11-22 | End: 2019-11-24

## 2019-11-22 RX ADMIN — AMLODIPINE BESYLATE 2.5 MG: 5 TABLET ORAL at 08:07

## 2019-11-22 RX ADMIN — METOPROLOL SUCCINATE 50 MG: 50 TABLET, EXTENDED RELEASE ORAL at 08:07

## 2019-11-22 RX ADMIN — Medication 1 AMPULE: at 08:07

## 2019-11-22 RX ADMIN — FAMOTIDINE 20 MG: 20 TABLET ORAL at 08:07

## 2019-11-22 RX ADMIN — ASPIRIN 81 MG: 81 TABLET ORAL at 08:07

## 2019-11-22 RX ADMIN — CIPROFLOXACIN 400 MG: 2 INJECTION, SOLUTION INTRAVENOUS at 10:35

## 2019-11-22 RX ADMIN — LOSARTAN POTASSIUM 100 MG: 50 TABLET, FILM COATED ORAL at 08:07

## 2019-11-22 RX ADMIN — LEVOTHYROXINE SODIUM 25 MCG: 50 TABLET ORAL at 05:27

## 2019-11-22 RX ADMIN — FUROSEMIDE 20 MG: 20 TABLET ORAL at 08:10

## 2019-11-22 RX ADMIN — Medication 10 ML: at 05:27

## 2019-11-22 RX ADMIN — POTASSIUM CHLORIDE 10 MEQ: 10 TABLET, EXTENDED RELEASE ORAL at 08:07

## 2019-11-22 RX ADMIN — HEPARIN SODIUM 5000 UNITS: 5000 INJECTION INTRAVENOUS; SUBCUTANEOUS at 05:27

## 2019-11-22 RX ADMIN — PANTOPRAZOLE SODIUM 40 MG: 40 TABLET, DELAYED RELEASE ORAL at 05:27

## 2019-11-22 NOTE — PROGRESS NOTES
TRANSFER - OUT REPORT:    Verbal report given to [address] on [address],  [address]  being transferred to [facility] for routine progression of care       Report consisted of patients Situation, Background, Assessment and   Recommendations(SBAR). Information from the following report(s) SBAR was reviewed with the receiving nurse. Lines:   Peripheral IV 19 Right;Upper Arm (Active)   Site Assessment Clean, dry, & intact 2019  8:10 AM   Phlebitis Assessment 0 2019  8:10 AM   Infiltration Assessment 0 2019  8:10 AM   Dressing Status Clean, dry, & intact 2019  8:10 AM   Dressing Type Transparent 2019  8:10 AM   Hub Color/Line Status Capped 2019  7:59 PM        Opportunity for questions and clarification was provided.       Patient transported with:   O2 @ 2 liters [facility]

## 2019-11-22 NOTE — DISCHARGE SUMMARY
Discharge Summary     Patient: Bal Bourgeois MRN: [id number]  SSN: [id number]    YOB: 1923  Age: 80 y.o.   Sex: female       Admit Date: 11/19/2019    Discharge Date: 11/22/2019      Admission Diagnoses: Hypoxia [R09.02]    Discharge Diagnoses:   Problem List as of 11/22/2019 Date Reviewed: 9/19/2017          Codes Class Noted - Resolved    * (Principal) Hypoxia ICD-10-CM: R09.02  ICD-9-CM: 799.02  11/20/2019 - Present        Acute metabolic encephalopathy RGX-61-MN: G93.41  ICD-9-CM: 348.31  11/20/2019 - Present        Abnormal x-ray ICD-10-CM: R93.89  ICD-9-CM: 793.99  11/20/2019 - Present        UTI (urinary tract infection) ICD-10-CM: N39.0  ICD-9-CM: 599.0  11/20/2019 - Present        HTN (hypertension) (Chronic) ICD-10-CM: I10  ICD-9-CM: 401.9  12/23/2010 - Present        Hypothyroidism (Chronic) ICD-10-CM: E03.9  ICD-9-CM: 244.9  12/23/2010 - Present        RESOLVED: Encephalopathy acute ICD-10-CM: G93.40  ICD-9-CM: 348.30  5/22/2013 - 11/20/2019        RESOLVED: Colitis, acute ICD-10-CM: K52.9  ICD-9-CM: 558.9  5/22/2013 - 11/20/2019        RESOLVED: Diarrhea ICD-10-CM: R19.7  ICD-9-CM: 787.91  5/21/2013 - 5/24/2013        RESOLVED: Bradycardia ICD-10-CM: R00.1  ICD-9-CM: 427.89  5/21/2013 - 5/24/2013        RESOLVED: Hypomagnesemia ICD-10-CM: E83.42  ICD-9-CM: 275.2  5/21/2013 - 5/24/2013        RESOLVED: Abdominal pain ICD-10-CM: R10.9  ICD-9-CM: 789.00  5/21/2013 - 5/24/2013        RESOLVED: Altered mental status ICD-10-CM: R41.82  ICD-9-CM: 780.97  5/18/2013 - 11/20/2019        RESOLVED: Dehydration ICD-10-CM: E86.0  ICD-9-CM: 276.51  5/18/2013 - 11/20/2019        RESOLVED: Hypernatremia ICD-10-CM: E87.0  ICD-9-CM: 276.0  5/18/2013 - 11/20/2019        RESOLVED: Unspecified constipation ICD-10-CM: K59.00  ICD-9-CM: 564.00  5/18/2013 - 11/20/2019        RESOLVED: Hyperglycemia ICD-10-CM: R73.9  ICD-9-CM: 790.29  5/18/2013 - 5/24/2013        RESOLVED: [facility] ICD-10-CM: T95.026T  ICD-9-CM: 820.8  12/23/2010 - 11/20/2019               Discharge Condition: stable    Hospital Course:     CC: Reason for admission is: hypoxia  Patient is a 80 y. o. female who presents to the ER due to a fall at her SNF in which she injured her right forearm and a laceration to her scalp as well. EMS reported that she was at her mental baseline.    Work-up in the emergency room unfortunately showed her to be hypoxic, at one point with a room air sat of 77%.  She has required O2 supplementation to keep her O2 saturations up. Chest x-ray was unrevealing. CT did suggest COPD and/or atelectasis. Etiology of her hypoxia was unclear on exam patient had faint bilateral basal crackles and a temp of 100.5 degrees earlier yesterday, has received ciprofloxacin x3 days we will continue another 2 daysdoubt infectionprocalcitonin 0.1, 11/19/2019 NT proBNP was 797, the repeat NT proBNP 431 2D echo LV ejection fraction 60 to 65% also with findings consistent with mild diastolic dysfunctionwe have resumed her Lasix 20 mg daily patient has no respiratory complaints, on oxygen at 2 L per nasal cannula. Intake output not documented correctly. Reported to have had a cookie in a laying down position and started coughing, evaluated by speech therapist patient has no trouble swallowing, per RN she has been eating all her meals,  Consults: Speech therapy- recommended aspiration precautions, eating in sitting up position. Significant Diagnostic Studies: As aboveas above     Disposition:stable    Discharge Medications:     Current Discharge Medication List      START taking these medications    Details   ciprofloxacin HCl (CIPRO) 500 mg tablet Take 1 Tab by mouth two (2) times a day for 2 days. Qty: 4 Tab, Refills: 0         CONTINUE these medications which have CHANGED    Details   furosemide (LASIX) 20 mg tablet Take 1 Tab by mouth daily.   Qty: 30 Tab, Refills: 0      potassium chloride (KLOR-CON) 10 mEq tablet Take 1 Tab by mouth daily for 30 days. Qty: 30 Tab, Refills: 0         CONTINUE these medications which have NOT CHANGED    Details   levothyroxine (SYNTHROID) 25 mcg tablet Take 25 mcg by mouth Daily (before breakfast). cholecalciferol, vitamin D3, (VITAMIN D3) 2,000 unit tab Take 2,000 Units by mouth daily. calcium carbonate (CALTREX) 600 mg calcium (1,500 mg) tablet Take 600 mg by mouth two (2) times a day. senna (SENNA) 8.6 mg tablet Take 2 Tabs by mouth nightly. melatonin 3 mg tablet Take 3 mg by mouth nightly. mirtazapine (REMERON) 15 mg tablet Take 15 mg by mouth nightly. cyanocobalamin (VITAMIN B-12) 100 mcg tablet Take 100 mcg by mouth daily. losartan (COZAAR) 100 mg tablet Take 1 Tab by mouth daily. Qty: 90 Tab, Refills: 1      omeprazole (PRILOSEC) 20 mg capsule       metoprolol-XL (TOPROL XL) 50 mg XL tablet Take 1 Tab by mouth daily. Qty: 30 Tab, Refills: 0      amLODIPine (NORVASC) 2.5 mg tablet Take 2.5 mg by mouth daily. aspirin 81 mg Tab Take 81 mg by mouth daily. meclizine (ANTIVERT) 12.5 mg tablet Take 12.5 mg by mouth every eight (8) hours as needed. Take 1,000 mg by mouth three (3) times daily as needed for Pain. MAGNESIUM HYDROXIDE (MILK OF MAGNESIA PO) do not use while on Ciprofloxin. Take 30 mL by mouth nightly. mupirocin (BACTROBAN) 2 % ointment Apply  to affected area two (2) times a day. Qty: 22 g, Refills: 1    Associated Diagnoses: Abrasion of ankle, left, initial encounter      atorvastatin (LIPITOR) 10 mg tablet       !! acetaminophen 650 mg Tab Take 650 mg by mouth every four (4) hours as needed. Qty: 30 Tab, Refills: 0            STOP taking these medications       meloxicam (MOBIC) 7.5 mg tablet Comments:   Reason for Stopping:         famotidine (PEPCID) 20 mg tablet Comments:   Reason for Stopping:               Activity: as tolerated    Requires oxygen at 2 L per nasal cannula at discharge.     Diet:  DIET REGULAR with aspiration precautions. DIET:   · continue prescribed diet  · PO:  Regular  · Liquids:  regular thin     MEDICATIONS: Whole in puree      ASPIRATION PRECAUTIONS  · Slow rate of intake  · Small bites/sips  · Upright at 90 degrees during meal          Wound Care: Follow-up Appointments   Procedures    FOLLOW UP VISIT Appointment in: [address] f/u BP check, fluid status labs. Patient on Laisx and Potassium     [facility] f/u BP check, fluid status labs. Patient on Laisx and Potassium     Standing Status:   Standing     Number of Occurrences:   1     Order Specific Question:   Appointment in     Answer:    One Week     Spent greater than 30 minutes planning discussing and arranging the    Signed By: Courtney Hansen MD     November 22, 2019

## 2019-11-22 NOTE — PROGRESS NOTES
Problem: Mobility Impaired (Adult and Pediatric)  Goal: *Acute Goals and Plan of Care (Insert Text)  Description  LTG:  (1.)Ms. Kiki Moscoso will move from supine to sit and sit to supine , scoot up and down and roll side to side in bed with STAND BY ASSIST within 7 treatment day(s). (2.)Ms. Kiki Moscoso will transfer from bed to chair and chair to bed with CONTACT GUARD ASSIST using the least restrictive device within 7 treatment day(s). (3.)Ms. Kiki Moscoso will ambulate with MINIMAL ASSIST for 50 feet with the least restrictive device within 7 treatment day(s). (4.)Ms. Kiki Moscoso will participate in therapeutic activity/exercises x 23 minutes for increased strength within 7 treatment days. (5.)Ms. Barnes will maintain static/dynamic sitting x 15 minutes with SUPERVISION for improved balance within 7 treatment days. ________________________________________________________________________________________________     Outcome: Progressing Towards Goal     PHYSICAL THERAPY: Initial Assessment and AM 11/22/2019  OBSERVATION: PT Visit Days : 1  Payor: Sharla Owen / Plan: [address] / Product Type: Managed Care Medicare /    R  WBAT     NAME/AGE/GENDER: Carmine Kramer is a 80 y.o. female   PRIMARY DIAGNOSIS: Hypoxia [R09.02] Hypoxia Hypoxia        ICD-10: Treatment Diagnosis:    · Generalized Muscle Weakness (M62.81)  · Difficulty in walking, Not elsewhere classified (R26.2)  · History of falling (Z91.81)   Precaution/Allergies:  Codeine; Penicillin g; and Adhesive tape      ASSESSMENT:     Ms. Kiki Moscoso is 80 y.o. female in the hospital for the above who was supine in bed upon arrival.  Pt appeared to have some confusion but oriented to person. She reported that PTA she was ambulating with RW but experiencing increased pain. She also stated that she has had several recent falls. Per case management, pt was residing in LTC PTA.   Per chart pt also as recent R hip fracture and was approved for R LE WBAT in September 2019. Ms. Kandi Vail presents to PT with decreased AROM and strength in B LEs. She also appears to have decreased B ankle dorsiflexion PROM. Pt came to sitting on EOB with Jasmeet x 2 and fair sitting balance. She demonstrated forward head posture with increased thoracic kyphosis. Pt was able to stand with Jasmeet x 2/RW and poor standing balance. She required verbal and manual cuing for side stepping at EOB with RW/Jasmeet x 2. Pt then assisted to sitting EOB and participated in in supported and unsupported sitting activities. Pt fatigued quickly and required cues for midline as well as posture. Pt then assisted back to supine with Jasmeet x 2 and placed in semi-chair position. Ms. Kandi Vail could benefit from skilled PT as she is currently functioning below her baseline. This section established at most recent assessment   PROBLEM LIST (Impairments causing functional limitations):  1. Decreased Strength  2. Decreased ADL/Functional Activities  3. Decreased Transfer Abilities  4. Decreased Ambulation Ability/Technique  5. Decreased Balance  6. Decreased Activity Tolerance  7. Decreased Cognition   INTERVENTIONS PLANNED: (Benefits and precautions of physical therapy have been discussed with the patient.)  1. Balance Exercise  2. Bed Mobility  3. Family Education  4. Gait Training  5. Therapeutic Activites  6. Therapeutic Exercise/Strengthening  7. Transfer Training     TREATMENT PLAN: Frequency/Duration: 3 times a week for duration of hospital stay  Rehabilitation Potential For Stated Goals: Good     REHAB RECOMMENDATIONS (at time of discharge pending progress):    Placement: It is my opinion, based on this patient's performance to date, that Ms. Kandi Vail may benefit from intensive therapy at a [address] [place] [address] after discharge due to the functional deficits listed above that are likely to improve with skilled rehabilitation and decreased functional mobility and balance.   Equipment:  None at this time              HISTORY:   History of Present Injury/Illness (Reason for Referral):  Hypoxia   Past Medical History/Comorbidities:   Ms. Roland Calderón  has a past medical history of Breast cancer ([facility] [address] Colitis, acute (5/22/2013), Encephalopathy acute (5/22/2013), Hip fracture, left ([facility] [address] (12/23/2010), Hyperlipidemia, Hypertension, Hypothyroidism (12/23/2010), Hypothyroidism (12/23/2010), and Unspecified constipation (5/18/2013). Ms. Roland Calderón  has a past surgical history that includes hx appendectomy; hx other surgical; hx orthopaedic; hx hysterectomy; and pr breast surgery procedure unlisted. Social History/Living Environment:   Home Environment: [address] [place] [address] Name: [address] [place] [address]  One/Two Story Residence: One story  Living Alone: No  Support Systems: Skilled nursing facility  Patient Expects to be Discharged to[de-identified] [address] [place] [address]  Current DME Used/Available at Home: Walker, rolling  Prior Level of Function/Work/Activity:  Per case management, pt was residing at [facility]. Pt reported she was ambulating with RW. Several recent falls. Number of Personal Factors/Comorbidities that affect the Plan of Care: 3+: HIGH COMPLEXITY   EXAMINATION:   Most Recent Physical Functioning:   Gross Assessment:  AROM: Generally decreased, functional  PROM: Generally decreased, functional(B dorsiflexion)  Strength: Generally decreased, functional               Posture:  Posture (WDL): Exceptions to WDL  Posture Assessment: Kyphosis, Forward head  Balance:  Sitting: Impaired  Sitting - Static: Fair (occasional)  Sitting - Dynamic: Fair (occasional)  Standing: Impaired  Standing - Static: Poor  Standing - Dynamic : Poor Bed Mobility:  Supine to Sit: Minimum assistance;Assist x2  Sit to Supine: Minimum assistance;Assist x2  Scooting:  Total assistance;Assist x2  Wheelchair Mobility:     Transfers:  Sit to Stand: Minimum assistance;Assist x2  Stand to Sit: Minimum assistance;Assist x2  Gait: Body Structures Involved:  1. Lungs  2. Muscles Body Functions Affected:  1. Respiratory  2. Neuromusculoskeletal  3. Movement Related Activities and Participation Affected:  1. Learning and Applying Knowledge  2. General Tasks and Demands  3. Mobility  4. Self Care  5. Domestic Life  6. Community, Social and [place]   Number of elements that affect the Plan of Care: 4+: HIGH COMPLEXITY   CLINICAL PRESENTATION:   Presentation: Stable and uncomplicated: LOW COMPLEXITY   CLINICAL DECISION MAKIN [facility] Inpatient Short Form  How much difficulty does the patient currently have. .. Unable A Lot A Little None   1. Turning over in bed (including adjusting bedclothes, sheets and blankets)? [] 1   [] 2   [x] 3   [] 4   2. Sitting down on and standing up from a chair with arms ( e.g., wheelchair, bedside commode, etc.)   [] 1   [] 2   [x] 3   [] 4   3. Moving from lying on back to sitting on the side of the bed? [] 1   [] 2   [x] 3   [] 4   How much help from another person does the patient currently need. .. Total A Lot A Little None   4. Moving to and from a bed to a chair (including a wheelchair)? [] 1   [x] 2   [] 3   [] 4   5. Need to walk in hospital room? [] 1   [x] 2   [] 3   [] 4   6. Climbing 3-5 steps with a railing? [] 1   [x] 2   [] 3   [] 4   © , Trustees of [address] [place] [address], under license to [employer]. All rights reserved      Score:  Initial: 15 Most Recent: X (Date: -- )    Interpretation of Tool:  Represents activities that are increasingly more difficult (i.e. Bed mobility, Transfers, Gait). Medical Necessity:     · Patient demonstrates   · good  ·  rehab potential due to higher previous functional level. Reason for Services/Other Comments:  · Decreased functional mobility, balance, and ambulation.    Use of outcome tool(s) and clinical judgement create a POC that gives a: Clear prediction of patient's progress: LOW COMPLEXITY            TREATMENT:   (In addition to Assessment/Re-Assessment sessions the following treatments were rendered)   Pre-treatment Symptoms/Complaints:  Confused  Pain: Initial:   Pain Intensity 1: 0  Post Session:  0     Therapeutic Activity: (    24 minutes): Therapeutic activities including Bed transfers, Ambulation on level ground, STS transfers, side-stepping at EOB, and sitting balance activities to improve mobility, strength, balance, and coordination. Required moderate   to promote static and dynamic balance in standing, promote coordination of bilateral, upper extremity(s), lower extremity(s), and safe transfer techniques. Braces/Orthotics/Lines/Etc:   · O2 Device: Nasal cannula  Treatment/Session Assessment:    · Response to Treatment:  Tolerated well but confused. · Interdisciplinary Collaboration:   o Physical Therapist  o Occupational Therapist  o Registered Nurse  o   · After treatment position/precautions:   o Supine in bed  o Bed alarm/tab alert on  o Bed/Chair-wheels locked  o Bed in low position  o Call light within reach  o RN notified  o Side rails x 3   · Compliance with Program/Exercises: Will assess as treatment progresses  · Recommendations/Intent for next treatment session: \"Next visit will focus on advancements to more challenging activities and reduction in assistance provided\".   Total Treatment Duration:  PT Patient Time In/Time Out  Time In: 0982  Time Out: [address] Roque PT, DPT

## 2019-11-22 NOTE — PROGRESS NOTES
No dysphagia goals identified as oropharyngeal swallow functional.     OBSERVATION STATUS  SPEECH LANGUAGE PATHOLOGY: DYSPHAGIA- Initial Assessment and Discharge    NAME/AGE/GENDER: Satish Rebolledo is a 80 y.o. female  DATE: 11/22/2019  PRIMARY DIAGNOSIS: Hypoxia [R09.02]      ICD-10: Treatment Diagnosis: R13.12 Dysphagia, Oropharyngeal Phase    INTERDISCIPLINARY COLLABORATION: Registered Nurse  PRECAUTIONS/ALLERGIES: Codeine; Penicillin g; and Adhesive tape       SUBJECTIVE   Daughter and son in law present. Per RN and daughter's report, patient got choked while eating cookie laying down last night. On regular diet/thin liquids at baseline per daughter. History of Present Injury/Illness: Ms. Celeste Client  has a past medical history of Breast cancer ([facility] [address] Colitis, acute (5/22/2013), Encephalopathy acute (5/22/2013), Hip fracture, left ([facility] [address] (12/23/2010), Hyperlipidemia, Hypertension, Hypothyroidism (12/23/2010), Hypothyroidism (12/23/2010), and Unspecified constipation (5/18/2013). . She also  has a past surgical history that includes hx appendectomy; hx other surgical; hx orthopaedic; hx hysterectomy; and pr breast surgery procedure unlisted. Problem List:  (Impairments causing functional limitations):  1. Oropharyngeal dysphagia- No symptoms identified   Previous Dysphagia:seen 5/2013 with recommendation of mechanical soft diet/thin liquids.    Daughter reports patient on meds for GERD     Diet Prior to Evaluation: regular textures/thin liquids        Orientation:   Person  Place    Pain: Pain Scale 1: Numeric (0 - 10)  Pain Intensity 1: 0       Cognitive-Linguistic Screen:   Speech Production:   o WNL    Cognition:   o Baseline per daughter   o Patient acknowledging that she has had some confusion \"I didn't know I was at [address] [place] [address] until my daughter just told me\"        OBJECTIVE   Oral Motor:   · Labial: No impairment  · Dentition: Natural and Limited  · Oral Hygiene: Adequate  · Lingual: No impairment Swallow assessment:  Self fed thin liquid via cup and straw, puree, a few bites of mixed consistency, cracker, and cookie with encouragement due to reported poor appetite. Adequate oral prep and clearance with all textures. Timely swallow initiation, and single swallows upon palpation. No overt signs or symptoms of airway compromise observed with liquid or solid textures. Vocal quality remained clear. ASSESSMENT   Patient presents with functional oropharyngeal swallow. Limited intake requiring encouragement. Patient required reminders by ST and her daughter for small bites. Recommend regular diet/thin liquids. Float medications in puree. Educated patient and family on aspiration precautions. No skilled dysphagia treatment indicated. Tool Used: Dysphagia Outcome and Severity Scale (TICO)    Score Comments   Normal Diet  [] 7 With no strategies or extra time needed   Functional Swallow  [] 6 May have mild oral or pharyngeal delay   Mild Dysphagia  [] 5 Which may require one diet consistency restricted    Mild-Moderate Dysphagia  [] 4 With 1-2 diet consistencies restricted   Moderate Dysphagia  [] 3 With 2 or more diet consistencies restricted   Moderate-Severe Dysphagia  [] 2 With partial PO strategies (trials with ST only)   Severe Dysphagia  [] 1 With inability to tolerate any PO safely      Score:  Initial: 6 Most Recent: 6 (Date 11/22/19 )   Interpretation of Tool: The Dysphagia Outcome and Severity Scale (TICO) is a simple, easy-to-use, 7-point scale developed to systematically rate the functional severity of dysphagia based on objective assessment and make recommendations for diet level, independence level, and type of nutrition. Current Medications:   No current facility-administered medications on file prior to encounter.       Current Outpatient Medications on File Prior to Encounter   Medication Sig Dispense Refill    levothyroxine (SYNTHROID) 25 mcg tablet Take 25 mcg by mouth Daily (before breakfast).  cholecalciferol, vitamin D3, (VITAMIN D3) 2,000 unit tab Take 2,000 Units by mouth daily.  calcium carbonate (CALTREX) 600 mg calcium (1,500 mg) tablet Take 600 mg by mouth two (2) times a day.  senna (SENNA) 8.6 mg tablet Take 2 Tabs by mouth nightly.  melatonin 3 mg tablet Take 3 mg by mouth nightly.  mirtazapine (REMERON) 15 mg tablet Take 15 mg by mouth nightly.  cyanocobalamin (VITAMIN B-12) 100 mcg tablet Take 100 mcg by mouth daily.  losartan (COZAAR) 100 mg tablet Take 1 Tab by mouth daily. 90 Tab 1    omeprazole (PRILOSEC) 20 mg capsule       metoprolol-XL (TOPROL XL) 50 mg XL tablet Take 1 Tab by mouth daily. 30 Tab 0    amLODIPine (NORVASC) 2.5 mg tablet Take 2.5 mg by mouth daily.  aspirin 81 mg Tab Take 81 mg by mouth daily.  meclizine (ANTIVERT) 12.5 mg tablet Take 12.5 mg by mouth every eight (8) hours as needed.  meloxicam (MOBIC) 7.5 mg tablet Take 7.5 mg by mouth daily as needed for Pain.  acetaminophen (TYLENOL EXTRA STRENGTH) 500 mg tablet Take 1,000 mg by mouth three (3) times daily as needed for Pain.  MAGNESIUM HYDROXIDE (MILK OF MAGNESIA PO) Take 30 mL by mouth nightly.  mupirocin (BACTROBAN) 2 % ointment Apply  to affected area two (2) times a day. 22 g 1    atorvastatin (LIPITOR) 10 mg tablet       acetaminophen 650 mg Tab Take 650 mg by mouth every four (4) hours as needed. 30 Tab 0         PLAN    FREQUENCY/DURATION: No further speech therapy indicated at this time as oropharyngeal swallow function is within normal limits. - Recommendations for next treatment session: No additional speech therapy indicated at this time.               CONTINUATION OF SKILLED SERVICES/MEDICAL NECESSITY:  n/a       RECOMMENDATIONS   DIET:    continue prescribed diet   PO:  Regular   Liquids:  regular thin    MEDICATIONS: Whole in puree     ASPIRATION PRECAUTIONS  · Slow rate of intake  · Small bites/sips  · Upright at 90 degrees during meal     COMPENSATORY STRATEGIES/MODIFICATIONS  · Small sips and bites  · Reflux precautions     EDUCATION:  · Recommendations discussed with Hospitalist  · Nursing  · Family  · Patient     RECOMMENDATIONS for CONTINUED SPEECH THERAPY:   No further speech therapy indicated at this time.        SAFETY:  After treatment position/precautions:  · Upright in bed  · RN and MD notified  · family at bedside  · Call light within reach    Total Treatment Duration:   Time In: 1150  Time Out: [address] [facility] [address] [address] [facility]

## 2019-11-22 NOTE — PROGRESS NOTES
Pt ready for DC back to [facility] today pt will transported via [address] set transport for 230 pm, pt will be going to room 208, report [phone]. Care Management Interventions  PCP Verified by CM:  Yes  Mode of Transport at Discharge: BLS([facility] ems-- [facility])  Hospital Transport Time of Discharge: [address] [facility] [address]: Nursing Facility([facility])  Confirm Follow Up Transport: Family  Plan discussed with Pt/Family/Caregiver: Yes  Freedom of Choice Offered: Yes  Discharge Location  Discharge Placement: Skilled nursing facility

## 2019-11-22 NOTE — PROGRESS NOTES
Problem: Falls - Risk of  Goal: *Absence of Falls  Description  Document Arian Quinn Fall Risk and appropriate interventions in the flowsheet. Outcome: Progressing Towards Goal  Note: Fall Risk Interventions:  Mobility Interventions: Bed/chair exit alarm, OT consult for ADLs, Patient to call before getting OOB, PT Consult for mobility concerns    Mentation Interventions: Adequate sleep, hydration, pain control, Bed/chair exit alarm, Door open when patient unattended    Medication Interventions: Bed/chair exit alarm, Patient to call before getting OOB, Teach patient to arise slowly    Elimination Interventions: Call light in reach, Bed/chair exit alarm, Patient to call for help with toileting needs, Toileting schedule/hourly rounds    History of Falls Interventions: Bed/chair exit alarm, Consult care management for discharge planning, Door open when patient unattended         Problem: Patient Education: Go to Patient Education Activity  Goal: Patient/Family Education  Outcome: Progressing Towards Goal     Problem: Pressure Injury - Risk of  Goal: *Prevention of pressure injury  Description  Document Jr Scale and appropriate interventions in the flowsheet.   Outcome: Progressing Towards Goal  Note: Pressure Injury Interventions:  Sensory Interventions: Assess changes in LOC, Check visual cues for pain    Moisture Interventions: Absorbent underpads, Apply protective barrier, creams and emollients, Check for incontinence Q2 hours and as needed    Activity Interventions: Increase time out of bed, PT/OT evaluation    Mobility Interventions: Float heels, PT/OT evaluation    Nutrition Interventions: Document food/fluid/supplement intake, Discuss nutritional consult with provider, Offer support with meals,snacks and hydration    Friction and Shear Interventions: Apply protective barrier, creams and emollients, Foam dressings/transparent film/skin sealants                Problem: Patient Education: Go to Patient Education Activity  Goal: Patient/Family Education  Outcome: Progressing Towards Goal

## 2019-11-22 NOTE — PROGRESS NOTES
Problem: Self Care Deficits Care Plan (Adult)  Goal: *Acute Goals and Plan of Care (Insert Text)  Description  1. Pt will toilet with min A   2. Pt will complete functional mobility for ADLs with min A  3. Pt will complete upper body dressing with set up using AE as needed  4. Pt will complete grooming and hygiene  with set up  5. Pt will complete bed mobility with SBA in prep for ADLs    Timeframe: 7 days     Outcome: Progressing Towards Goal     OCCUPATIONAL THERAPY: Initial Assessment and Daily Note 11/22/2019  OBSERVATION: OT Visit Days: 1  Payor: [address] [place],9D / Plan: [address] / Product Type: [employer] /      NAME/AGE/GENDER: Lotus Garcia is a 80 y.o. female   PRIMARY DIAGNOSIS:  Hypoxia [R09.02] Hypoxia Hypoxia        ICD-10: Treatment Diagnosis:    · Generalized Muscle Weakness (M62.81)   Precautions/Allergies:    WBAT Codeine; Penicillin g; and Adhesive tape      ASSESSMENT:     Ms. Aimee Alex was admitted from SNF with hypoxia, fall. Pt has a history of a recent femoral fx, per chart is WBAT. Pt confused and had difficulty providing PLOF information, reports that she required assistance with bathing and dressing and recently with mobility using a RW. This session, pt presented with deficits in mobility, balance, strength, cognition, and endurance impacting ADLs. Pt required min A for bed mobility, mod to stand and take sidesteps, max A to don socks. Pt generally weak, appears frail. Pt would benefit from skilled OT services to address deficits, recommend d/c back to SNF. This section established at most recent assessment   PROBLEM LIST (Impairments causing functional limitations):  1. Decreased Strength  2. Decreased ADL/Functional Activities  3. Decreased Transfer Abilities  4. Decreased Balance  5. Decreased Activity Tolerance  6.  Decreased Cognition   INTERVENTIONS PLANNED: (Benefits and precautions of occupational therapy have been discussed with the patient.)  1. Activities of daily living training  2. Adaptive equipment training  3. Balance training  4. Therapeutic activity  5. Therapeutic exercise     TREATMENT PLAN: Frequency/Duration: Follow patient 2 times/ week to address above goals. Rehabilitation Potential For Stated Goals: Good     REHAB RECOMMENDATIONS (at time of discharge pending progress):    Placement: It is my opinion, based on this patient's performance to date, that Ms. Ranjith Veliz may benefit from d/c back to SNF  Equipment:    None at this time              [address]:   History of Present Injury/Illness (Reason for Referral):  See H&P  Past Medical History/Comorbidities:   Ms. Ranjith Veliz  has a past medical history of Breast cancer ([facility] [address] Colitis, acute (5/22/2013), Encephalopathy acute (5/22/2013), Hip fracture, left ([facility] [address] (12/23/2010), Hyperlipidemia, Hypertension, Hypothyroidism (12/23/2010), Hypothyroidism (12/23/2010), and Unspecified constipation (5/18/2013). Ms. Ranjith Veliz  has a past surgical history that includes hx appendectomy; hx other surgical; hx orthopaedic; hx hysterectomy; and pr breast surgery procedure unlisted.   Social History/Living Environment:   Home Environment: [address] [place] [address] Name: [address] [place] [address]  One/Two Story Residence: One story  Living Alone: No  Support Systems: Skilled nursing facility  Patient Expects to be Discharged to[de-identified] Skilled nursing facility  Current DME Used/Available at Home: [employer], rolling  Tub or Shower Type: Shower  Prior Level of Function/Work/Activity:  See assessment     Number of Personal Factors/Comorbidities that affect the Plan of Care: Expanded review of therapy/medical records (1-2):  MODERATE COMPLEXITY   ASSESSMENT OF OCCUPATIONAL PERFORMANCE[de-identified]   Activities of Daily Living:   Basic ADLs (From Assessment) Complex ADLs (From Assessment)   Feeding: Setup  Oral Facial Hygiene/Grooming: Contact guard assistance  Bathing: Maximum assistance  Upper Body Dressing: Minimum assistance  Lower Body Dressing: Maximum assistance  Toileting: Maximum assistance Instrumental ADL  Meal Preparation: Total assistance  Homemaking: Total assistance   Grooming/Bathing/Dressing Activities of Daily Living     Cognitive Retraining  Safety/Judgement: Fall prevention;Decreased awareness of environment;Decreased insight into deficits                     Lower Body Dressing Assistance  Socks: Maximum assistance Bed/Mat Mobility  Supine to Sit: Minimum assistance  Sit to Supine: Minimum assistance  Sit to Stand: Moderate assistance  Stand to Sit: Moderate assistance  Scooting: Minimum assistance     Most Recent Physical Functioning:   Gross Assessment:  AROM: Within functional limits  Strength: Generally decreased, functional               Posture:  Posture (WDL): Exceptions to WDL  Posture Assessment: Kyphosis, Forward head  Balance:  Sitting: Impaired  Sitting - Static: Fair (occasional)  Sitting - Dynamic: Poor (constant support)  Standing: Impaired  Standing - Static: Poor  Standing - Dynamic : Poor Bed Mobility:  Supine to Sit: Minimum assistance  Sit to Supine: Minimum assistance  Scooting: Minimum assistance  Wheelchair Mobility:     Transfers:  Sit to Stand:  Moderate assistance  Stand to Sit: Moderate assistance            Patient Vitals for the past 6 hrs:   BP BP Patient Position SpO2 O2 Flow Rate (L/min) Pulse   11/22/19 0753 109/50 At rest 94 %  64   11/22/19 1221 105/62 At rest 91 %  71   11/22/19 1222   91 % 2 l/min        Mental Status  Neurologic State: Alert, Confused  Orientation Level: Oriented to person, Oriented to time  Cognition: Decreased attention/concentration, Decreased command following, Follows commands  Perception: Appears intact  Perseveration: No perseveration noted  Safety/Judgement: Fall prevention, Decreased awareness of environment, Decreased insight into deficits                          Physical Skills Involved:  1. Balance  2. Strength  3. Activity Tolerance Cognitive Skills Affected (resulting in the inability to perform in a timely and safe manner):  1. Executive Function  2. Short Term Recall  3. Long Term Memory Psychosocial Skills Affected:  1. Habits/Routines  2. Environmental Adaptation   Number of elements that affect the Plan of Care: 3-5:  MODERATE COMPLEXITY   CLINICAL DECISION MAKIN[address] [place] [address] AM-PAC 6 Clicks   Daily Activity Inpatient Short Form  How much help from another person does the patient currently need. .. Total A Lot A Little None   1. Putting on and taking off regular lower body clothing? [] 1   [x] 2   [] 3   [] 4   2. Bathing (including washing, rinsing, drying)? [] 1   [x] 2   [] 3   [] 4   3. Toileting, which includes using toilet, bedpan or urinal?   [] 1   [x] 2   [] 3   [] 4   4. Putting on and taking off regular upper body clothing? [] 1   [] 2   [x] 3   [] 4   5. Taking care of personal grooming such as brushing teeth? [] 1   [] 2   [x] 3   [] 4   6. Eating meals? [] 1   [] 2   [x] 3   [] 4   © , Trustees of [address] [place] [address], under license to [employer]. All rights reserved      Score:  Initial: 15 Most Recent: X (Date: -- )    Interpretation of Tool:  Represents activities that are increasingly more difficult (i.e. Bed mobility, Transfers, Gait). Medical Necessity:     · Patient is expected to demonstrate progress in strength, balance and functional technique to decrease assistance required with ADLs. Reason for Services/Other Comments:  · Patient continues to require present interventions due to patient's inability to complete ADLs.    Use of outcome tool(s) and clinical judgement create a POC that gives a: MODERATE COMPLEXITY         TREATMENT:   (In addition to Assessment/Re-Assessment sessions the following treatments were rendered)     Pre-treatment Symptoms/Complaints:    Pain: Initial:   Pain Intensity 1: 0  Post Session:  0     Self Care: (8 min): Procedure(s) (per grid) utilized to improve and/or restore self-care/home management as related to dressing. Required minimal   cueing to facilitate activities of daily living skills and compensatory activities. Braces/Orthotics/Lines/Etc:   · O2 Device: Nasal cannula  Treatment/Session Assessment:    · Response to Treatment:  No adverse reaction   · Interdisciplinary Collaboration:   o Occupational Therapist  o Registered Nurse  · After treatment position/precautions:   o Supine in bed  o Bed alarm/tab alert on  o Bed/Chair-wheels locked  o Bed in low position  o Call light within reach  o RN notified   · Compliance with Program/Exercises: Will assess as treatment progresses.     Total Treatment Duration:  OT Patient Time In/Time Out  Time In: 1101  Time Out: [address] [facility]

## 2019-11-22 NOTE — PROGRESS NOTES
Problem: Falls - Risk of  Goal: *Absence of Falls  Description  Document Aleida Bishop Fall Risk and appropriate interventions in the flowsheet. Outcome: Progressing Towards Goal  Note: Fall Risk Interventions:  Mobility Interventions: Bed/chair exit alarm, OT consult for ADLs, Patient to call before getting OOB, PT Consult for mobility concerns    Mentation Interventions: Adequate sleep, hydration, pain control, Bed/chair exit alarm, Door open when patient unattended, More frequent rounding    Medication Interventions: Bed/chair exit alarm, Evaluate medications/consider consulting pharmacy, Patient to call before getting OOB, Teach patient to arise slowly    Elimination Interventions: Call light in reach, Bed/chair exit alarm, Patient to call for help with toileting needs, Stay With Me (per policy)    History of Falls Interventions: Bed/chair exit alarm, Door open when patient unattended, Investigate reason for fall         Problem: Patient Education: Go to Patient Education Activity  Goal: Patient/Family Education  Outcome: Progressing Towards Goal     Problem: Pressure Injury - Risk of  Goal: *Prevention of pressure injury  Description  Document Jr Scale and appropriate interventions in the flowsheet. Outcome: Progressing Towards Goal  Note: Pressure Injury Interventions:  Sensory Interventions: Assess changes in LOC, Assess need for specialty bed, Check visual cues for pain, Discuss PT/OT consult with provider, Pad between skin to skin, Pressure redistribution bed/mattress (bed type), Suspension boots, Turn and reposition approx.  every two hours (pillows and wedges if needed)    Moisture Interventions: Absorbent underpads, Apply protective barrier, creams and emollients, Check for incontinence Q2 hours and as needed, Limit adult briefs    Activity Interventions: Increase time out of bed, Pressure redistribution bed/mattress(bed type), PT/OT evaluation    Mobility Interventions: HOB 30 degrees or less, Pressure redistribution bed/mattress (bed type), PT/OT evaluation, Suspension boots    Nutrition Interventions: Document food/fluid/supplement intake    Friction and Shear Interventions: Apply protective barrier, creams and emollients, Foam dressings/transparent film/skin sealants                Problem: Patient Education: Go to Patient Education Activity  Goal: Patient/Family Education  Outcome: Progressing Towards Goal